# Patient Record
Sex: MALE | Race: WHITE | URBAN - METROPOLITAN AREA
[De-identification: names, ages, dates, MRNs, and addresses within clinical notes are randomized per-mention and may not be internally consistent; named-entity substitution may affect disease eponyms.]

---

## 2020-02-17 LAB — CREATININE, EXTERNAL: 1.1

## 2020-07-09 VITALS
WEIGHT: 315 LBS | BODY MASS INDEX: 44.1 KG/M2 | SYSTOLIC BLOOD PRESSURE: 131 MMHG | HEART RATE: 79 BPM | DIASTOLIC BLOOD PRESSURE: 90 MMHG | TEMPERATURE: 98 F | OXYGEN SATURATION: 95 % | HEIGHT: 71 IN

## 2020-07-09 PROBLEM — J01.90 ACUTE SINUSITIS: Status: ACTIVE | Noted: 2020-07-09

## 2020-07-09 PROBLEM — M25.50 MULTIPLE JOINT PAIN: Status: ACTIVE | Noted: 2020-07-09

## 2020-07-09 PROBLEM — I10 ESSENTIAL HYPERTENSION: Status: ACTIVE | Noted: 2020-07-09

## 2020-07-09 PROBLEM — J30.9 ALLERGIC RHINITIS: Status: ACTIVE | Noted: 2020-07-09

## 2020-07-09 PROBLEM — N41.9 PROSTATITIS: Status: ACTIVE | Noted: 2020-07-09

## 2020-07-09 RX ORDER — LISINOPRIL 10 MG/1
TABLET ORAL DAILY
COMMUNITY
End: 2021-12-30

## 2020-07-09 RX ORDER — AMOXICILLIN AND CLAVULANATE POTASSIUM 200; 28.5 MG/5ML; MG/5ML
POWDER, FOR SUSPENSION ORAL 2 TIMES DAILY
COMMUNITY
End: 2022-02-16 | Stop reason: ALTCHOICE

## 2020-07-09 RX ORDER — HYDROMORPHONE HYDROCHLORIDE 2 MG/1
TABLET ORAL
COMMUNITY
End: 2022-02-16 | Stop reason: ALTCHOICE

## 2020-07-09 RX ORDER — MELOXICAM 15 MG/1
15 TABLET ORAL DAILY
COMMUNITY
End: 2022-02-16 | Stop reason: ALTCHOICE

## 2020-07-09 RX ORDER — FEXOFENADINE HYDROCHLORIDE AND PSEUDOEPHEDRINE HYDROCHLORIDE 180; 240 MG/1; MG/1
1 TABLET, FILM COATED, EXTENDED RELEASE ORAL DAILY
COMMUNITY
End: 2022-02-16 | Stop reason: ALTCHOICE

## 2020-07-10 VITALS
SYSTOLIC BLOOD PRESSURE: 131 MMHG | BODY MASS INDEX: 44.1 KG/M2 | WEIGHT: 315 LBS | HEIGHT: 71 IN | DIASTOLIC BLOOD PRESSURE: 90 MMHG

## 2020-07-23 ENCOUNTER — OFFICE VISIT (OUTPATIENT)
Dept: ORTHOPEDIC SURGERY | Age: 45
End: 2020-07-23

## 2020-07-23 VITALS — BODY MASS INDEX: 42 KG/M2 | WEIGHT: 300 LBS | HEIGHT: 71 IN

## 2020-07-23 DIAGNOSIS — S83.8X1D ACUTE MEDIAL MENISCAL INJURY OF KNEE, RIGHT, SUBSEQUENT ENCOUNTER: Primary | ICD-10-CM

## 2020-07-23 RX ORDER — CEPHALEXIN 500 MG/1
500 CAPSULE ORAL 4 TIMES DAILY
Qty: 28 CAP | Refills: 0 | Status: SHIPPED | OUTPATIENT
Start: 2020-07-23 | End: 2020-07-28 | Stop reason: SDUPTHER

## 2020-07-23 NOTE — PROGRESS NOTES
Subjective: Curtis Donahue is a 50 y.o. male who presents today for preop visit. Past Medical History:   Diagnosis Date    Hypertension        Past Surgical History:   Procedure Laterality Date    HX CHOLECYSTECTOMY           Current Outpatient Medications   Medication Sig Dispense Refill    cephALEXin (KEFLEX) 500 mg capsule Take 1 Cap by mouth four (4) times daily. 28 Cap 0    fexofenadine-pseudoephedrine (Allegra-D 24 Hour) 180-240 mg per tablet Take 1 Tab by mouth daily.  amoxicillin-clavulanate (AUGMENTIN) 200-28.5 mg/5 mL suspension Take  by mouth two (2) times a day.  HYDROmorphone (DILAUDID) 2 mg tablet Take  by mouth every four (4) hours as needed for Pain.  lisinopriL (PRINIVIL, ZESTRIL) 10 mg tablet Take  by mouth daily.  meloxicam (MOBIC) 15 mg tablet Take 15 mg by mouth daily. Allergies   Allergen Reactions    Codeine Nausea and Vomiting       ROS:  Patient is a pleasant appearing individual, appropriately dressed, well hydrated, well nourished, who is alert, appropriately oriented for age, and in no acute distress with a normal gait and normal affect who does not appear to be in any significant pain. The patient was counseled about the risks of isabel Covid-19 during their perioperative period and any recovery window from their procedure. The patient was made aware that isabel Covid-19 may worsen their prognosis for recovering from their procedure and lend to a higher morbidity and/or mortality risk. All material risks, benefits, and reasonable alternatives including postponing the procedure were discussed. The patient DOES wish to proceed with their procedure at this time. Please note that this patient will be scheduled for surgery and that Dr. Mile Traore is requesting a pre-op medical clearance for GENERAL Anesthesia. Our office will schedule the appt for the medical clearance with the medical provider.  After evaluating the patient, please fax all labs, EKGs, diagnostic studies, and a risk assessment / clearance note (that is legible or typed ) indicating if the patient is medically cleared to Dr. Duane Dennison (Attn: Surgery Scheduling) ASAP to [phone]. If the patient is not or will not be medically cleared prior to the surgery date please notify Dr. Jackson Peñaloza office. Thank you for assisting me in this patient's care. Physical Exam:  Right Knee - Slight decrease range of motion, Grossly neurovascularly intact, Good cap refill, No skin lesions, Mild swelling, No gross instability, Some Quadriceps weakness, Positive medial joint line tenderness, Positive Peng's exam    Left Knee - Normal range of motion, Grossly neurovascularly intact, Good cap refill, No skin lesions, No swelling, No gross instability, No weakness, No medial joint line tenderness, Negative Peng's exam    ICD-10-CM ICD-9-CM    1. Acute medial meniscal injury of knee, right, subsequent encounter  S83.8X1D V58.89      959.7        HPI:  The patient is here status post right medial meniscus tear here for right knee arthroscopy meniscectomy synovectomy preop visit. The patient has had continued difficulty, has failed conservative treatment. He does say he got an abrasion from a family members dog approximately 5 days ago on the lower leg, otherwise no new complaints. Physical Exam:  Physical examination shows an approximately 2 ½ cm abrasion on the lower distal tibia of the right lower extremity, well-healing, no signs of drainage. It is crusted over, otherwise no other abrasions or lesions noted. Assessment/Plan:  1. Right knee medial meniscus tear. Plan for patient, right knee arthroscopy meniscectomy synovectomy. The patient has been medically cleared for surgery, agrees to [employer] and consent and risk of exposure. If he gets any new abrasion, he is going to call the office as needed as soon as possible. We will send in an antibiotic. He will take that until gone. If he gets any new abrasion, he will call us. I did warn him if it gets worse, the surgery may get canceled. Keep it covered with a medicated band-aid in the meantime and go from there. If anything changes or gets worse in the meantime, patient can call the office as needed and well go from there. Stop all blood thinners 7-10 days prior to surgery. KORTNEY Roberts      Follow-up and Dispositions    · Return for ALREADY SCHEDULED.

## 2020-07-28 ENCOUNTER — DOCUMENTATION ONLY (OUTPATIENT)
Dept: ORTHOPEDIC SURGERY | Age: 45
End: 2020-07-28

## 2020-07-28 DIAGNOSIS — M25.50 MULTIPLE JOINT PAIN: Primary | ICD-10-CM

## 2020-07-28 DIAGNOSIS — G89.18 POSTOPERATIVE PAIN: ICD-10-CM

## 2020-07-28 RX ORDER — OXYCODONE AND ACETAMINOPHEN 5; 325 MG/1; MG/1
1 TABLET ORAL
Qty: 30 TAB | Refills: 0 | Status: SHIPPED | OUTPATIENT
Start: 2020-07-28 | End: 2020-08-04

## 2020-07-28 RX ORDER — CEPHALEXIN 500 MG/1
500 CAPSULE ORAL 4 TIMES DAILY
Qty: 4 CAP | Refills: 0 | Status: SHIPPED | OUTPATIENT
Start: 2020-07-28 | End: 2020-07-29

## 2020-08-18 ENCOUNTER — VIRTUAL VISIT (OUTPATIENT)
Dept: ORTHOPEDIC SURGERY | Age: 45
End: 2020-08-18
Payer: COMMERCIAL

## 2020-08-18 DIAGNOSIS — S83.8X1D ACUTE MEDIAL MENISCAL INJURY OF KNEE, RIGHT, SUBSEQUENT ENCOUNTER: Primary | ICD-10-CM

## 2020-08-18 DIAGNOSIS — G89.29 CHRONIC PAIN OF RIGHT KNEE: ICD-10-CM

## 2020-08-18 DIAGNOSIS — M25.561 CHRONIC PAIN OF RIGHT KNEE: ICD-10-CM

## 2020-08-18 PROCEDURE — 99024 POSTOP FOLLOW-UP VISIT: CPT | Performed by: PHYSICIAN ASSISTANT

## 2020-08-18 NOTE — PROGRESS NOTES
[facility] and [facility] Medicine  General Follow up    Subjective: Douglas Thorpe is a 50 y.o. male presents for follow up after right Knee medial meniscus tear 3 weeks ago. Pain is generally well controlled. Patient is generally without complaint. He does admit to some significant bruising and has not yet started his aspirin. He states that he was having so much swelling and discomfort that he could not start therapy until several days ago. He feels as though he is now progressing. This encounter took place virtually. The patient [place]. We spent about 10 minutes discussing his issues during the visit. ROS  Patient is a pleasant appearing individual, appropriately dressed, well hydrated, well nourished, who is alert, appropriately oriented for age, and in no acute distress with a normal gait and normal affect who does not appear to be in any significant pain. Objective:     VSS, AFEB      Physical Exam  Right Knee - Slight decrease range of motion, Grossly neurovascularly intact, Good cap refill, No skin lesions, Mild swelling, No gross instability, Some Quadriceps weakness, Positive medial joint line tenderness    Left Knee - Normal range of motion, Grossly neurovascularly intact, Good cap refill, No skin lesions, No swelling, No gross instability, No weakness, No medial joint line tenderness, Negative Peng's exam    Assessment:     The primary encounter diagnosis was Acute medial meniscal injury of knee, right, subsequent encounter. A diagnosis of Chronic pain of right knee was also pertinent to this visit. Doing well overall. Mr. MACK [facility]AGASTUME [facility] has a reminder for a \"due or due soon\" health maintenance. I have asked that he contact his primary care provider for follow-up on this health maintenance. Plan:     --Patient is now \"ready\" to try to progress in physical therapy. I encouraged him to proceed as discussed.   Given his slow progression we will touch base virtually in 2 weeks, should he stop progressing or regress he will not hesitate to contact us and/or let us know. Questions were solicited and answered to the patient's satisfaction and the patient will follow-up as discussed.       Signed By: Edith Angeles PA-C     August 18, 2020

## 2020-09-03 ENCOUNTER — OFFICE VISIT (OUTPATIENT)
Dept: ORTHOPEDIC SURGERY | Age: 45
End: 2020-09-03
Payer: COMMERCIAL

## 2020-09-03 DIAGNOSIS — G89.29 CHRONIC PAIN OF RIGHT KNEE: ICD-10-CM

## 2020-09-03 DIAGNOSIS — M25.561 CHRONIC PAIN OF RIGHT KNEE: ICD-10-CM

## 2020-09-03 DIAGNOSIS — S83.8X1D ACUTE MEDIAL MENISCAL INJURY OF KNEE, RIGHT, SUBSEQUENT ENCOUNTER: Primary | ICD-10-CM

## 2020-09-03 DIAGNOSIS — E66.01 OBESITY, MORBID (HCC): ICD-10-CM

## 2020-09-03 PROCEDURE — 99024 POSTOP FOLLOW-UP VISIT: CPT | Performed by: PHYSICIAN ASSISTANT

## 2020-09-03 NOTE — PROGRESS NOTES
[facility] and [facility]  General Follow up    Subjective: Floresita Barker is a 50 y.o. male presents for follow up after right Knee medial meniscus tear 4 weeks ago. Pain is generally well controlled. Patient is generally without complaint. He does admit to some significant bruising and has not yet started his aspirin. He states that he was having so much swelling and discomfort that he could not start therapy until several days ago. He feels as though he is now progressing. He has started PT and is doing well. ROS  Patient is a pleasant appearing individual, appropriately dressed, well hydrated, well nourished, who is alert, appropriately oriented for age, and in no acute distress with a normal gait and normal affect who does not appear to be in any significant pain. Objective:     VSS, AFEB      Physical Exam  Right Knee - Slight decrease range of motion, Grossly neurovascularly intact, Good cap refill, No skin lesions, Mild swelling, No gross instability, Some Quadriceps weakness, Positive medial joint line tenderness     Left Knee - Normal range of motion, Grossly neurovascularly intact, Good cap refill, No skin lesions, No swelling, No gross instability, No weakness, No medial joint line tenderness, Negative Peng's exam       Assessment:     Acute medial meniscal injury of knee, right, subsequent encounter [S83. 8X1D]   Problem List Items Addressed This Visit     None      Visit Diagnoses     Acute medial meniscal injury of knee, right, subsequent encounter    -  Primary    Chronic pain of right knee             Doing well overall. Mr. Marei Dominguez has a reminder for a \"due or due soon\" health maintenance. I have asked that he contact his primary care provider for follow-up on this health maintenance. Plan:     --Continue post op care plan, PT and ADL's. F/U Dr. Grace Jimenez as needed, sooner should need be.  Pt may return to work without restrictions starting Tues 8 Sept 2020 due to the upcoming holiday. Questions were solicited and answered to the patient's satisfaction and the patient will follow-up as discussed.       Signed By: Meche Baez PA-C     September 3, 2020

## 2020-09-04 ENCOUNTER — OFFICE VISIT (OUTPATIENT)
Dept: FAMILY MEDICINE CLINIC | Age: 45
End: 2020-09-04
Payer: COMMERCIAL

## 2020-09-04 DIAGNOSIS — S83.206D TEAR OF RIGHT MENISCUS AS CURRENT INJURY, SUBSEQUENT ENCOUNTER: Primary | ICD-10-CM

## 2020-09-04 PROCEDURE — 99213 OFFICE O/P EST LOW 20 MIN: CPT | Performed by: FAMILY MEDICINE

## 2020-09-04 NOTE — PROGRESS NOTES
Odilia Prasad presents today for   Chief Complaint   Patient presents with    Letter for School/Work         Depression Screening:  3 most recent PHQ Screens 9/4/2020   Little interest or pleasure in doing things Several days   Feeling down, depressed, irritable, or hopeless Several days   Total Score PHQ 2 2       Learning Assessment:  Learning Assessment 7/23/2020   PRIMARY LEARNER Patient   HIGHEST LEVEL OF EDUCATION - PRIMARY LEARNER  2 YEARS OF COLLEGE   BARRIERS PRIMARY LEARNER NONE   PRIMARY LANGUAGE ENGLISH   LEARNER PREFERENCE PRIMARY LISTENING   LEARNING SPECIAL TOPICS NO   ANSWERED BY PATIENT   RELATIONSHIP SELF       Health Maintenance reviewed and discussed and ordered per Provider. Health Maintenance Due   Topic Date Due    DTaP/Tdap/Td series (1 - Tdap) 05/13/1994    Lipid Screen  05/13/2013    Flu Vaccine (1) 09/01/2020   . Coordination of Care:  1. Have you been to the ER, urgent care clinic since your last visit? Hospitalized since your last visit? no    2. Have you seen or consulted any other health care providers outside of the [address] [place] [address] since your last visit? Include any pap smears or colon screening.  no      Last UDS Checked no  Last Pain contract signed: no

## 2020-09-04 NOTE — PROGRESS NOTES
Subjective: Kya Colvin is a 50 y.o. male who was seen for Letter for School/Work    HPI patient is a 24-year-old male who has a history of surgery a month ago on the right knee. He had meniscus surgery and osteoarthritis cleaned out he is feeling much better. He does a lot of walking at work he works as a  he has had no falls or injuries no rash no syncope no loss of consciousness. He is going back to work on 9/8/2020 his bowel movements have been appropriate he has had no falls or injuries no rash no syncope no loss of consciousness. No chest pain or shortness of breath. Nobody at home is been sick his blood pressures have been excellent his wife checks them. He does not be significant alcohol or tobacco.  He has had no recent falls he sleeps well bowel movements have been appropriate he is even played golf recently and that was quite successful. Denies any anxiety or depression he will be going back to work as a  but he thinks he can do all the things that he is being asked to do    Home Medications    Medication Sig Start Date End Date Taking? Authorizing Provider   fexofenadine-pseudoephedrine (Allegra-D 24 Hour) 180-240 mg per tablet Take 1 Tab by mouth daily. Provider, Historical   amoxicillin-clavulanate (AUGMENTIN) 200-28.5 mg/5 mL suspension Take  by mouth two (2) times a day. Provider, Historical   HYDROmorphone (DILAUDID) 2 mg tablet Take  by mouth every four (4) hours as needed for Pain. Provider, Historical   lisinopriL (PRINIVIL, ZESTRIL) 10 mg tablet Take  by mouth daily. Provider, Historical   meloxicam (MOBIC) 15 mg tablet Take 15 mg by mouth daily.     Provider, Historical      Allergies   Allergen Reactions    Codeine Nausea and Vomiting     Social History     Tobacco Use    Smoking status: Never Smoker    Smokeless tobacco: Never Used   Substance Use Topics    Alcohol use: Yes     Frequency: Monthly or less    Drug use: Not on file Review of Systems   Constitutional: Negative. HENT: Negative. Eyes: Negative. Respiratory: Negative. Cardiovascular: Negative. Gastrointestinal: Negative. Genitourinary: Negative. Musculoskeletal: Positive for joint swelling. Allergic/Immunologic: Negative. Neurological: Negative. Hematological: Negative. Psychiatric/Behavioral: Negative. Physical Exam   Objective: There were no vitals taken for this visit. General: alert, cooperative, no distress   Mental  status: normal mood, behavior, speech, dress, motor activity, and thought processes, able to follow commands   HENT: NCAT   Neck: no visualized mass   Resp: no respiratory distress   Neuro: no gross deficits   Skin: no discoloration or lesions of concern on visible areas   Psychiatric: normal affect, consistent with stated mood, no evidence of hallucinations   Actually think he has very good range of motion of the knee I do not appreciate any swelling or redness his pulses appropriate no edema all vital signs are appropriate his gait seems good. He is in no significant distress at all. He is certainly not clinically anxious or depressed. Assessment & Plan:     Viscus injury status post surgery, hypertension: I am very pleased with the recovery in his knee. It looks quite excellent I do suspect he will have a knee replacement probably in the next 4 to 5 years but is certainly is acceptable for going back to work I have given him a note to let him go back to work on 9/8/2020 and will follow-up if any other issues arise. He does not need anti-inflammatory medicine at this time. He will call me if any other issues arise. 712    Additional exam findings: We discussed the expected course, resolution and complications of the diagnosis(es) in detail. Medication risks, benefits, costs, interactions, and alternatives were discussed as indicated.   I advised him to contact the office if his condition worsens, changes or fails to improve as anticipated. He expressed understanding with the diagnosis(es) and plan.

## 2021-03-16 ENCOUNTER — VIRTUAL VISIT (OUTPATIENT)
Dept: FAMILY MEDICINE CLINIC | Age: 46
End: 2021-03-16
Payer: COMMERCIAL

## 2021-03-16 DIAGNOSIS — I10 ESSENTIAL HYPERTENSION: Primary | ICD-10-CM

## 2021-03-16 DIAGNOSIS — L65.9 ALOPECIA: ICD-10-CM

## 2021-03-16 DIAGNOSIS — M25.50 MULTIPLE JOINT PAIN: ICD-10-CM

## 2021-03-16 DIAGNOSIS — E66.01 OBESITY, MORBID (HCC): ICD-10-CM

## 2021-03-16 DIAGNOSIS — I83.93 VARICOSE VEINS OF BOTH LOWER EXTREMITIES, UNSPECIFIED WHETHER COMPLICATED: ICD-10-CM

## 2021-03-16 DIAGNOSIS — L30.9 DERMATITIS: ICD-10-CM

## 2021-03-16 PROCEDURE — 99442 PR PHYS/QHP TELEPHONE EVALUATION 11-20 MIN: CPT | Performed by: FAMILY MEDICINE

## 2021-03-16 RX ORDER — LISINOPRIL 20 MG/1
20 TABLET ORAL DAILY
COMMUNITY
End: 2021-03-17

## 2021-03-16 NOTE — PROGRESS NOTES
Saúl Corea presents today for   Chief Complaint   Patient presents with    Rash     hair loss- 1 month       Depression Screening:  3 most recent PHQ Screens 3/16/2021   Little interest or pleasure in doing things Not at all   Feeling down, depressed, irritable, or hopeless Not at all   Total Score PHQ 2 0       Learning Assessment:  Learning Assessment 7/23/2020   PRIMARY LEARNER Patient   HIGHEST LEVEL OF EDUCATION - PRIMARY LEARNER  2 YEARS OF COLLEGE   BARRIERS PRIMARY LEARNER NONE   PRIMARY LANGUAGE ENGLISH   LEARNER PREFERENCE PRIMARY LISTENING   LEARNING SPECIAL TOPICS NO   ANSWERED BY PATIENT   RELATIONSHIP SELF       Health Maintenance reviewed and discussed and ordered per Provider. Health Maintenance Due   Topic Date Due    Hepatitis C Screening  Never done    COVID-19 Vaccine (1) Never done    DTaP/Tdap/Td series (1 - Tdap) Never done    Lipid Screen  Never done    Flu Vaccine (1) Never done   . Coordination of Care:  1. Have you been to the ER, urgent care clinic since your last visit? Hospitalized since your last visit? no    2. Have you seen or consulted any other health care providers outside of the [address] [place] [address] since your last visit? Include any pap smears or colon screening.  No    Both legs from knee to ankle

## 2021-03-16 NOTE — PROGRESS NOTES
Gilma Choe is a 50 y.o. male, evaluated via audio-only technology on 3/16/2021 for Rash (hair loss- 1 month)  . Assessment & Plan: Hypertension, joint pain, obesity, loss of hair in the extremities, chronic dermatitis, varicose veins of the lower extremities: This was a 20-minute telephone to telephone visit the patient insisted that he wanted to be seen the next day I think he needs a vascular venous evaluation of his varicose veins that could the be the reason for hair loss on his legs. He has a dermatitis that I cannot explain and were going to set up a dermatology appointment he has had joint discomfort as well and that the little concerning almost wonders about psoriasis. His blood pressures have been appropriate. He works on a regular basis. He has some obesity as well. There are multiple issues that I think need a face-to-face evaluation the patient is insistent on being seen tomorrow I was in my office the patient was at his home       12  Subjective: The patient is a 49-year-old male who is seen for evaluation he seen in a telephone evaluation today he has had no falls or injuries no rash no syncope or loss of consciousness. Bowel movements have been appropriate. Eating relatively well. Nobody at home has been sick no chest pain or shortness of breath no rash no syncope or loss of consciousness. He has some hair loss in his lower legs. No falls or injuries no rashes except new ones which are in several places on his body eating relatively well he has lower leg varicosities which are bothersome       Prior to Admission medications    Medication Sig Start Date End Date Taking? Authorizing Provider   lisinopriL (PRINIVIL, ZESTRIL) 20 mg tablet Take 20 mg by mouth daily. Yes Provider, Historical   fexofenadine-pseudoephedrine (Allegra-D 24 Hour) 180-240 mg per tablet Take 1 Tab by mouth daily.  As needed   Yes Provider, Historical   amoxicillin-clavulanate (AUGMENTIN) 200-28.5 mg/5 mL suspension Take  by mouth two (2) times a day. Provider, Historical   HYDROmorphone (DILAUDID) 2 mg tablet Take  by mouth every four (4) hours as needed for Pain. Provider, Historical   lisinopriL (PRINIVIL, ZESTRIL) 10 mg tablet Take  by mouth daily. Provider, Historical   meloxicam (MOBIC) 15 mg tablet Take 15 mg by mouth daily. Provider, Historical     Patient Active Problem List   Diagnosis Code    Acute sinusitis J01.90    Allergic rhinitis J30.9    Essential hypertension I10    Multiple joint pain M25.50    Prostatitis N41.9    Obesity, morbid ([facility] [address] E66.01    Alopecia L65.9    Dermatitis L30.9    Varicose veins of both lower extremities I83.93       Review of Systems   Constitutional: Negative. Eyes: Negative. Respiratory: Negative. Cardiovascular: Negative. Gastrointestinal: Negative. Genitourinary: Negative. Musculoskeletal: Positive for back pain. Skin: Negative. Neurological: Negative. Endo/Heme/Allergies: Negative. Psychiatric/Behavioral: Negative. No flowsheet data found. Dieudonne Reed, who was evaluated through a patient-initiated, synchronous (real-time) audio only encounter, and/or her healthcare decision maker, is aware that it is a billable service, with coverage as determined by his insurance carrier. He provided verbal consent to proceed: n/a- consent obtained within past 12 months. He has not had a related appointment within my department in the past 7 days or scheduled within the next 24 hours.       Total Time: minutes: 11-20 minutes    Beatriz Zhao MD

## 2021-03-17 ENCOUNTER — OFFICE VISIT (OUTPATIENT)
Dept: FAMILY MEDICINE CLINIC | Age: 46
End: 2021-03-17
Payer: COMMERCIAL

## 2021-03-17 VITALS
BODY MASS INDEX: 44.47 KG/M2 | HEIGHT: 70 IN | OXYGEN SATURATION: 95 % | DIASTOLIC BLOOD PRESSURE: 71 MMHG | SYSTOLIC BLOOD PRESSURE: 108 MMHG | HEART RATE: 94 BPM | TEMPERATURE: 97 F | WEIGHT: 310.6 LBS

## 2021-03-17 DIAGNOSIS — L30.9 DERMATITIS: ICD-10-CM

## 2021-03-17 DIAGNOSIS — L65.9 ALOPECIA: ICD-10-CM

## 2021-03-17 DIAGNOSIS — I10 ESSENTIAL HYPERTENSION: ICD-10-CM

## 2021-03-17 DIAGNOSIS — M25.50 MULTIPLE JOINT PAIN: ICD-10-CM

## 2021-03-17 DIAGNOSIS — I83.93 VARICOSE VEINS OF BOTH LOWER EXTREMITIES, UNSPECIFIED WHETHER COMPLICATED: ICD-10-CM

## 2021-03-17 DIAGNOSIS — L29.9 PRURITUS: ICD-10-CM

## 2021-03-17 DIAGNOSIS — L29.9 PRURITUS: Primary | ICD-10-CM

## 2021-03-17 PROCEDURE — 99213 OFFICE O/P EST LOW 20 MIN: CPT | Performed by: FAMILY MEDICINE

## 2021-03-17 RX ORDER — LISINOPRIL 20 MG/1
20 TABLET ORAL DAILY
Qty: 90 TAB | Refills: 1 | Status: SHIPPED | OUTPATIENT
Start: 2021-03-17 | End: 2021-09-24 | Stop reason: SDUPTHER

## 2021-03-17 NOTE — PROGRESS NOTES
Adolfo Navarro presents today for   Chief Complaint   Patient presents with    Rash     both legs and he is losing his hair x's a month , very itchy, he has not changed detergent or soaps       Is someone accompanying this pt? no    Is the patient using any DME equipment during [address]? no    Depression Screening:  3 most recent PHQ Screens 3/17/2021   Little interest or pleasure in doing things Not at all   Feeling down, depressed, irritable, or hopeless Not at all   Total Score PHQ 2 0       Learning Assessment:  Learning Assessment 7/23/2020   PRIMARY LEARNER Patient   HIGHEST LEVEL OF EDUCATION - PRIMARY LEARNER  2 YEARS OF COLLEGE   BARRIERS PRIMARY LEARNER NONE   PRIMARY LANGUAGE ENGLISH   LEARNER PREFERENCE PRIMARY LISTENING   LEARNING SPECIAL TOPICS NO   ANSWERED BY PATIENT   RELATIONSHIP SELF       Fall Risk  Fall Risk Assessment, last 12 mths 3/17/2021   Able to walk? Yes   Fall in past 12 months? 0   Do you feel unsteady? 0   Are you worried about falling 0       ADL  ADL Assessment 3/17/2021   Feeding yourself No Help Needed   Getting from bed to chair No Help Needed   Getting dressed No Help Needed   Bathing or showering No Help Needed   Walk across the room (includes cane/walker) No Help Needed   Using the telphone No Help Needed   Taking your medications No Help Needed   Preparing meals No Help Needed   Managing money (expenses/bills) No Help Needed   Moderately strenuous housework (laundry) No Help Needed   Shopping for personal items (toiletries/medicines) No Help Needed   Shopping for groceries No Help Needed   Driving No Help Needed   Climbing a flight of stairs No Help Needed   Getting to places beyond walking distances No Help Needed       Travel Screening:    Travel Screening     Question   Response    In the last month, have you been in contact with someone who was confirmed or suspected to have Coronavirus / COVID-19? No / Unsure    Have you had a COVID-19 viral test in the last 14 days?   No Do you have any of the following new or worsening symptoms? Have you traveled internationally or domestically in the last month? No      Travel History   Travel since 02/17/21     No documented travel since 02/17/21          Health Maintenance reviewed and discussed and ordered per Provider. Health Maintenance Due   Topic Date Due    Hepatitis C Screening  Never done    COVID-19 Vaccine (1) Never done    DTaP/Tdap/Td series (1 - Tdap) Never done    Lipid Screen  Never done    Flu Vaccine (1) Never done   . Coordination of Care:  1. Have you been to the ER, urgent care clinic since your last visit? Hospitalized since your last visit? no    2. Have you seen or consulted any other health care providers outside of the [address] [place] [address] since your last visit? Include any pap smears or colon screening.  no

## 2021-03-17 NOTE — PROGRESS NOTES
Subjective: Rony Combs is a 50 y.o. male who was seen for Rash (both legs and he is losing his hair x's a month , very itchy, he has not changed detergent or soaps)    HPI is seen today. He was having some itching in his skin I am not sure why he was doing that I brought him in the office for evaluation. He is also complaining of varicose veins in his legs and a rash. He is losing the hair on his legs. He has had no falls or injuries. He has had no rashes otherwise. He works in a [employer]. He is grossly overweight. He has had no falls or injuries. He is eating relatively well. But up an appointment for dermatology and also for a venous doctor to evaluate the veins. Home Medications    Medication Sig Start Date End Date Taking? Authorizing Provider   lisinopriL (PRINIVIL, ZESTRIL) 20 mg tablet Take 20 mg by mouth daily. Provider, Historical   fexofenadine-pseudoephedrine (Allegra-D 24 Hour) 180-240 mg per tablet Take 1 Tab by mouth daily. As needed    Provider, Historical   amoxicillin-clavulanate (AUGMENTIN) 200-28.5 mg/5 mL suspension Take  by mouth two (2) times a day. Provider, Historical   HYDROmorphone (DILAUDID) 2 mg tablet Take  by mouth every four (4) hours as needed for Pain. Provider, Historical   lisinopriL (PRINIVIL, ZESTRIL) 10 mg tablet Take  by mouth daily. Provider, Historical   meloxicam (MOBIC) 15 mg tablet Take 15 mg by mouth daily.     Provider, Historical      Allergies   Allergen Reactions    Codeine Nausea and Vomiting     Social History     Tobacco Use    Smoking status: Never Smoker    Smokeless tobacco: Never Used   Substance Use Topics    Alcohol use: Yes     Frequency: Monthly or less    Drug use: Never        Patient Active Problem List   Diagnosis Code    Acute sinusitis J01.90    Allergic rhinitis J30.9    Essential hypertension I10    Multiple joint pain M25.50    Prostatitis N41.9    Obesity, morbid ([address] E66.01    Alopecia L65.9  Dermatitis L30.9    Varicose veins of both lower extremities I83.93       Review of Systems   Constitutional: Negative. HENT: Negative. Eyes: Negative. Respiratory: Negative. Endocrine: Negative. Genitourinary: Negative. Musculoskeletal: Negative. Allergic/Immunologic: Negative. Neurological: Negative. Hematological: Negative. Psychiatric/Behavioral: Negative. Physical Exam   Objective:     Visit Vitals  /71   Pulse 94   Temp 97 °F (36.1 °C)   Ht 5' 10\" (1.778 m)   Wt 310 lb 9.6 oz (140.9 kg)   SpO2 95%   BMI 44.57 kg/m²      General: alert, cooperative, no distress   Mental  status: normal mood, behavior, speech, dress, motor activity, and thought processes, able to follow commands   HENT: NCAT   Neck: no visualized mass   Resp: no respiratory distress   Neuro: no gross deficits   Skin: no discoloration or lesions of concern on visible areas   Psychiatric: normal affect, consistent with stated mood, no evidence of hallucinations   Strongly impressed with his rash but he is. He has some small varicosities but they are in several areas on his legs he seems to be overly distraught about them as well. The pruritus is very confusing I am going to get blood work. I am going to send him to dermatology and also send him to the venous study doctor and follow-up from there    Assessment & Plan:             712    Additional exam findings: We discussed the expected course, resolution and complications of the diagnosis(es) in detail. Medication risks, benefits, costs, interactions, and alternatives were discussed as indicated. I advised him to contact the office if his condition worsens, changes or fails to improve as anticipated. He expressed understanding with the diagnosis(es) and plan.

## 2021-03-18 LAB — CREATININE, EXTERNAL: 0.9

## 2021-03-20 ENCOUNTER — TELEPHONE (OUTPATIENT)
Dept: FAMILY MEDICINE CLINIC | Age: 46
End: 2021-03-20

## 2021-09-24 DIAGNOSIS — I10 ESSENTIAL HYPERTENSION: ICD-10-CM

## 2021-09-24 RX ORDER — LISINOPRIL 20 MG/1
20 TABLET ORAL DAILY
Qty: 90 TABLET | Refills: 0 | Status: SHIPPED | OUTPATIENT
Start: 2021-09-24 | End: 2021-12-30

## 2021-10-05 ENCOUNTER — OFFICE VISIT (OUTPATIENT)
Dept: ORTHOPEDIC SURGERY | Age: 46
End: 2021-10-05
Payer: COMMERCIAL

## 2021-10-05 VITALS — BODY MASS INDEX: 43.67 KG/M2 | HEIGHT: 70 IN | WEIGHT: 305 LBS

## 2021-10-05 DIAGNOSIS — M25.562 LEFT KNEE PAIN, UNSPECIFIED CHRONICITY: Primary | ICD-10-CM

## 2021-10-05 DIAGNOSIS — M17.12 OSTEOARTHRITIS OF LEFT KNEE, UNSPECIFIED OSTEOARTHRITIS TYPE: ICD-10-CM

## 2021-10-05 PROCEDURE — 99214 OFFICE O/P EST MOD 30 MIN: CPT | Performed by: ORTHOPAEDIC SURGERY

## 2021-10-05 NOTE — PROGRESS NOTES
Name: Rolf Joshua    : 1975     Service Dept: [facility]    Patient's Pharmacies:    [address], [address] [id number] [facility] [address]  Phone: [phone] Fax: [phone]       Chief Complaint   Patient presents with    Leg Pain     Left        Visit Vitals  Ht 5' 10\" (1.778 m)   Wt 305 lb (138.3 kg)   BMI 43.76 kg/m²      Allergies   Allergen Reactions    Codeine Nausea and Vomiting      Current Outpatient Medications   Medication Sig Dispense Refill    lisinopriL (PRINIVIL, ZESTRIL) 20 mg tablet Take 1 Tablet by mouth daily. Patient needs appointment 90 Tablet 0    fexofenadine-pseudoephedrine (Allegra-D 24 Hour) 180-240 mg per tablet Take 1 Tab by mouth daily. As needed      amoxicillin-clavulanate (AUGMENTIN) 200-28.5 mg/5 mL suspension Take  by mouth two (2) times a day.  HYDROmorphone (DILAUDID) 2 mg tablet Take  by mouth every four (4) hours as needed for Pain.  lisinopriL (PRINIVIL, ZESTRIL) 10 mg tablet Take  by mouth daily.  meloxicam (MOBIC) 15 mg tablet Take 15 mg by mouth daily.         Patient Active Problem List   Diagnosis Code    Acute sinusitis J01.90    Allergic rhinitis J30.9    Essential hypertension I10    Multiple joint pain M25.50    Prostatitis N41.9    Obesity, morbid ([address] E66.01    Alopecia L65.9    Dermatitis L30.9    Varicose veins of both lower extremities I83.93      Family History   Problem Relation Age of Onset    Cancer Mother     Lung Disease Father       Social History     Socioeconomic History    Marital status:      Spouse name: Not on file    Number of children: Not on file    Years of education: Not on file    Highest education level: Not on file   Tobacco Use    Smoking status: Never Smoker    Smokeless tobacco: Never Used   Vaping Use    Vaping Use: Never used   Substance and Sexual Activity  Alcohol use: Yes    Drug use: Never     Social Determinants of Health     Financial Resource Strain:     Difficulty of Paying Living Expenses:    Food Insecurity:     Worried About Running Out of Food in the Last Year:     [address] in the Last Year:    Transportation Needs:     Lack of Transportation (Medical):  Lack of Transportation (Non-Medical):    Physical Activity:     Days of Exercise per Week:     Minutes of Exercise per Session:    Stress:     Feeling of Stress :    Social Connections:     Frequency of Communication with Friends and Family:     Frequency of Social Gatherings with Friends and Family:     Attends Hinduism Services:     Active Member of Clubs or Organizations:     Attends Club or Organization Meetings:     Marital Status:       Past Surgical History:   Procedure Laterality Date    HX CHOLECYSTECTOMY        Past Medical History:   Diagnosis Date    Hypertension         I have reviewed and agree with [address] [place] [address] Nw and ROS and intake form in chart and the record furthermore I have reviewed prior medical record(s) regarding this patients care during this appointment. Review of Systems:   Patient is a pleasant appearing individual, appropriately dressed, well hydrated, well nourished, who is alert, appropriately oriented for age, and in no acute distress with a normal gait and normal affect who does not appear to be in any significant pain. Physical Exam:  Left Knee - Slight decrease range of motion, Grossly neurovascularly intact, Good cap refill, No skin lesions, Mild swelling, No gross instability, Some Quadriceps weakness, Positive medial joint line tenderness, Positive Peng's exam    Right Knee - Normal range of motion, Grossly neurovascularly intact, Good cap refill, No skin lesions, No swelling, No gross instability, No weakness, No medial joint line tenderness, Negative Peng's exam   Encounter Diagnoses     ICD-10-CM ICD-9-CM   1.  Left knee pain, unspecified chronicity  M25.562 719.46   2. Osteoarthritis of left knee, unspecified osteoarthritis type  M17.12 715.96       HPI:  The patient is here with a chief complaint of left knee pain, twisting and injury with it where he felt a pop and having pain in the back of the knee and on the medial side. Pain is significant. Pain is 6/10. Ice and ibuprofen have helped. Pressure makes it worse. X-rays again AP, lateral, and oblique done in our office are unremarkable. Assessment/Plan:  1. Left knee possible meniscal pathology. We will keep him out of work until we get the post-MRI results and also I want to go ahead and put him in for an MRI of the left knee. I will see him back post-MRI and go from there. As part of continued conservative pain management options the patient was advised to utilize Tylenol or OTC NSAIDS as long as it is not medically contraindicated. Return to Office: Follow-up and Dispositions    · Return for POST MRI. Scribed by Shannon Olea LPN as dictated by [facility] [place] AMAURI Castillo MD.  Documentation True and Accepted [facility] AMAURI Castillo MD

## 2021-10-05 NOTE — PATIENT INSTRUCTIONS
Knee Pain or Injury: Care Instructions  Your Care Instructions     Injuries are a common cause of knee problems. Sudden (acute) injuries may be caused by a direct blow to the knee. They can also be caused by abnormal twisting, bending, or falling on the knee. Pain, bruising, or swelling may be severe, and may start within minutes of the injury. Overuse is another cause of knee pain. Other causes are climbing stairs, kneeling, and other activities that use the knee. Everyday wear and tear, especially as you get older, also can cause knee pain. Rest, along with home treatment, often relieves pain and allows your knee to heal. If you have a serious knee injury, you may need tests and treatment. Follow-up care is a key part of your treatment and safety. Be sure to make and go to all appointments, and call your doctor if you are having problems. It's also a good idea to know your test results and keep a list of the medicines you take. How can you care for yourself at home? · Be safe with medicines. Read and follow all instructions on the label. ? If the doctor gave you a prescription medicine for pain, take it as prescribed. ? If you are not taking a prescription pain medicine, ask your doctor if you can take an over-the-counter medicine. · Rest and protect your knee. Take a break from any activity that may cause pain. · Put ice or a cold pack on your knee for 10 to 20 minutes at a time. Put a thin cloth between the ice and your skin. · Prop up a sore knee on a pillow when you ice it or anytime you sit or lie down for the next 3 days. Try to keep it above the level of your heart. This will help reduce swelling. · If your knee is not swollen, you can put moist heat, a heating pad, or a warm cloth on your knee. · If your doctor recommends an elastic bandage, sleeve, or other type of support for your knee, wear it as directed.   · Follow your doctor's instructions about how much weight you can put on your leg. Use a cane, crutches, or a walker as instructed. · Follow your doctor's instructions about activity during your healing process. If you can do mild exercise, slowly increase your activity. · Reach and stay at a healthy weight. Extra weight can strain the joints, especially the knees and hips, and make the pain worse. Losing even a few pounds may help. When should you call for help? Call 911 anytime you think you may need emergency care. For example, call if:    · You have symptoms of a blood clot in your lung (called a pulmonary embolism). These may include:  ? Sudden chest pain. ? Trouble breathing. ? Coughing up blood. Call your doctor now or seek immediate medical care if:    · You have severe or increasing pain.     · Your leg or foot turns cold or changes color.     · You cannot stand or put weight on your knee.     · Your knee looks twisted or bent out of shape.     · You cannot move your knee.     · You have signs of infection, such as:  ? Increased pain, swelling, warmth, or redness. ? Red streaks leading from the knee. ? Pus draining from a place on your knee. ? A fever.     · You have signs of a blood clot in your leg (called a deep vein thrombosis), such as:  ? Pain in your calf, back of the knee, thigh, or groin. ? Redness and swelling in your leg or groin. Watch closely for changes in your health, and be sure to contact your doctor if:    · You have tingling, weakness, or numbness in your knee.     · You have any new symptoms, such as swelling.     · You have bruises from a knee injury that last longer than 2 weeks.     · You do not get better as expected. Where can you learn more? Go to http://www.gray.com/  Enter K195 in the search box to learn more about \"Knee Pain or Injury: Care Instructions. \"  Current as of: July 1, 2021               Content Version: 13.0  © 0813-5953 [employer]wise, Inc[employer].    Care instructions adapted under license by [employer] (which disclaims liability or warranty for this information). If you have questions about a medical condition or this instruction, always ask your healthcare professional. [address] any warranty or liability for your use of this information.

## 2021-10-05 NOTE — LETTER
NOTIFICATION RETURN TO WORK / SCHOOL    10/5/2021 8:31 AM    Mr. Ngoc Willoughby  [address] [facility] [address]      To Whom It May Concern:    Ngoc Willoughby is currently under the care of [address] [place] [address]. He will return to work once he is reevaluated in our office post MRI. If there are questions or concerns please have the patient contact our office.         Sincerely,      Radha Mckenna MD

## 2021-10-07 ENCOUNTER — OFFICE VISIT (OUTPATIENT)
Dept: ORTHOPEDIC SURGERY | Age: 46
End: 2021-10-07
Payer: COMMERCIAL

## 2021-10-07 DIAGNOSIS — M17.12 OSTEOARTHRITIS OF LEFT KNEE, UNSPECIFIED OSTEOARTHRITIS TYPE: ICD-10-CM

## 2021-10-07 DIAGNOSIS — M25.562 LEFT KNEE PAIN, UNSPECIFIED CHRONICITY: ICD-10-CM

## 2021-10-07 DIAGNOSIS — M25.562 LEFT KNEE PAIN, UNSPECIFIED CHRONICITY: Primary | ICD-10-CM

## 2021-10-07 PROCEDURE — 99214 OFFICE O/P EST MOD 30 MIN: CPT | Performed by: ORTHOPAEDIC SURGERY

## 2021-10-07 PROCEDURE — 20611 DRAIN/INJ JOINT/BURSA W/US: CPT | Performed by: ORTHOPAEDIC SURGERY

## 2021-10-07 RX ORDER — LIDOCAINE HYDROCHLORIDE 10 MG/ML
9 INJECTION INFILTRATION; PERINEURAL ONCE
Status: COMPLETED | OUTPATIENT
Start: 2021-10-07 | End: 2021-10-07

## 2021-10-07 RX ORDER — TRIAMCINOLONE ACETONIDE 40 MG/ML
40 INJECTION, SUSPENSION INTRA-ARTICULAR; INTRAMUSCULAR ONCE
Status: COMPLETED | OUTPATIENT
Start: 2021-10-07 | End: 2021-10-07

## 2021-10-07 RX ADMIN — TRIAMCINOLONE ACETONIDE 40 MG: 40 INJECTION, SUSPENSION INTRA-ARTICULAR; INTRAMUSCULAR at 16:00

## 2021-10-07 RX ADMIN — LIDOCAINE HYDROCHLORIDE 9 ML: 10 INJECTION INFILTRATION; PERINEURAL at 16:00

## 2021-10-07 NOTE — LETTER
NOTIFICATION RETURN TO WORK / SCHOOL    10/7/2021 3:31 PM    Mr. Kirsty Neri  [address] [facility] [address]      To Whom It May Concern:    Kirsty Neri is currently under the care of [address] [place] [address]. He will return to work 11/1/2021 with no restrictions. If there are questions or concerns please have the patient contact our office.         Sincerely,      Den Milton MD

## 2021-10-07 NOTE — PROGRESS NOTES
Name: Aimee Sagastume    : 1975     Service Dept: [address] [facility]    Patient's Pharmacies:    [facility] [address] [id number] [facility] [address]  Phone: [phone] Fax: [phone]       Chief Complaint   Patient presents with    Knee Pain        There were no vitals taken for this visit. Allergies   Allergen Reactions    Codeine Nausea and Vomiting      Current Outpatient Medications   Medication Sig Dispense Refill    lisinopriL (PRINIVIL, ZESTRIL) 20 mg tablet Take 1 Tablet by mouth daily. Patient needs appointment 90 Tablet 0    fexofenadine-pseudoephedrine (Allegra-D 24 Hour) 180-240 mg per tablet Take 1 Tab by mouth daily. As needed      amoxicillin-clavulanate (AUGMENTIN) 200-28.5 mg/5 mL suspension Take  by mouth two (2) times a day.  HYDROmorphone (DILAUDID) 2 mg tablet Take  by mouth every four (4) hours as needed for Pain.  lisinopriL (PRINIVIL, ZESTRIL) 10 mg tablet Take  by mouth daily.  meloxicam (MOBIC) 15 mg tablet Take 15 mg by mouth daily.        Current Facility-Administered Medications   Medication Dose Route Frequency Provider Last Rate Last Admin    [COMPLETED] lidocaine (XYLOCAINE) 10 mg/mL (1 %) injection 9 mL  9 mL Other ONCE Jensen Wright MD   9 mL at 10/07/21 1600    [COMPLETED] triamcinolone acetonide (KENALOG-40) 40 mg/mL injection 40 mg  40 mg Intra artICUlar ONCE Jensen Wright MD   40 mg at 10/07/21 1600      Patient Active Problem List   Diagnosis Code    Acute sinusitis J01.90    Allergic rhinitis J30.9    Essential hypertension I10    Multiple joint pain M25.50    Prostatitis N41.9    Obesity, morbid (HCC) E66.01    Alopecia L65.9    Dermatitis L30.9    Varicose veins of both lower extremities I83.93      Family History   Problem Relation Age of Onset    Cancer Mother     Lung Disease Father       Social History Socioeconomic History    Marital status:      Spouse name: Not on file    Number of children: Not on file    Years of education: Not on file    Highest education level: Not on file   Tobacco Use    Smoking status: Never Smoker    Smokeless tobacco: Never Used   Vaping Use    Vaping Use: Never used   Substance and Sexual Activity    Alcohol use: Yes    Drug use: Never     Social Determinants of Health     Financial Resource Strain:     Difficulty of Paying Living Expenses:    Food Insecurity:     Worried About Running Out of Food in the Last Year:     [address] in the Last Year:    Transportation Needs:     Lack of Transportation (Medical):  Lack of Transportation (Non-Medical):    Physical Activity:     Days of Exercise per Week:     Minutes of Exercise per Session:    Stress:     Feeling of Stress :    Social Connections:     Frequency of Communication with Friends and Family:     Frequency of Social Gatherings with Friends and Family:     Attends Mu-ism Services:     Active Member of Clubs or Organizations:     Attends Club or Organization Meetings:     Marital Status:       Past Surgical History:   Procedure Laterality Date    HX CHOLECYSTECTOMY        Past Medical History:   Diagnosis Date    Hypertension         I have reviewed and agree with [address] [facility] [address] and ROS and intake form in chart and the record furthermore I have reviewed prior medical record(s) regarding this patients care during this appointment. Review of Systems:   Patient is a pleasant appearing individual, appropriately dressed, well hydrated, well nourished, who is alert, appropriately oriented for age, and in no acute distress with a normal gait and normal affect who does not appear to be in any significant pain.    Physical Exam:  Left Knee - Slight decrease range of motion, Grossly neurovascularly intact, Good cap refill, No skin lesions, Mild swelling, No gross instability, Some Quadriceps weakness, Positive medial joint line tenderness, Positive Peng's exam    Right Knee - Normal range of motion, Grossly neurovascularly intact, Good cap refill, No skin lesions, No swelling, No gross instability, No weakness, No medial joint line tenderness, Negative Peng's exam   Encounter Diagnoses     ICD-10-CM ICD-9-CM   1. Left knee pain, unspecified chronicity  M25.562 719.46   2. Osteoarthritis of left knee, unspecified osteoarthritis type  M17.12 715.96       HPI:  The patient is here with a chief complaint of left knee pain, dull throbbing pain, progressively getting worse. Pain is 6/10. X-rays of the left knee are unremarkable. MRI is positive for meniscal root tear and some swelling. Assessment/Plan:  Plan at this point, we will try a cortisone injection in left knee. If it helps, it is all we need to do. We will also put the patient for quad PT and go from there. If he is not better in 3 weeks, we will talk to him about arthroscopic surgery. We will also continue to work on weight loss program.      As part of continued conservative pain management options the patient was advised to utilize Tylenol or OTC NSAIDS as long as it is not medically contraindicated. Return to Office: Follow-up and Dispositions    · Return in about 3 weeks (around 10/28/2021). Administrations This Visit     lidocaine (XYLOCAINE) 10 mg/mL (1 %) injection 9 mL     Admin Date  10/07/2021 Action  Given Dose  9 mL Route  Other Administered By  Thao Barker LPN          triamcinolone acetonide (KENALOG-40) 40 mg/mL injection 40 mg     Admin Date  10/07/2021 Action  Given Dose  40 mg Route  Intra artICUlar Administered By  Thao Barker LPN               Scribed by Madeline Booth LPN as dictated by [facility] AMAURI Ledezma MD.  Documentation True and Accepted Jensen Ledezma MD

## 2021-10-07 NOTE — LETTER
Teddy Caprices   1975   [id number]       10/7/2021       I hereby authorize and direct Jensen Cadet MD, Saúl Esteban, and whomever he may designate as his associate to perform upon myself the following procedure:    Injection of: Kenalog, Supartz, Euflexxa, Orthovisc in the Right/Left ____________________. If any unforeseen condition arises in the course of the procedure, I further authorize him and his associated and/or assistant(s) to do whatever he/she deems advisable. The nature, purpose, benefits, risks, side effects, likelihood of achieving goals, and potential problems that might occur during recuperation, risks for not receiving the proposed care, treatment and services and alternatives of the procedure have been fully explained to me by my physician including, but not limited to:    Swelling, joint pain, skin pigment changes, worsening of condition, and failure to improve. I acknowledge that no guarantee or assurance has been made to me as to the results that may be obtained or the likelihood of success.                 _______________________________________     Signature of patient or authorized representative                [employer] [facility] fax: [phone]

## 2021-10-07 NOTE — PATIENT INSTRUCTIONS
Meniscus Tear: Care Instructions  Overview     The meniscus is rubbery tissue in the knee that acts as a shock absorber between the upper and lower leg bones. The meniscus also keeps your knee stable by spreading weight across it. Each knee has two menisci (plural of meniscus). You can tear a meniscus if you plant your foot and twist, or pivot. The meniscus also can wear down as you age, and it can tear from squatting or kneeling. Small tears may heal on their own with rest and some physical therapy. But a more serious tear may need surgery to repair it or to remove part of the meniscus. Your doctor may want you to see a doctor who specializes in bones and sports injuries. Follow-up care is a key part of your treatment and safety. Be sure to make and go to all appointments, and call your doctor if you are having problems. It's also a good idea to know your test results and keep a list of the medicines you take. How can you care for yourself at home? · Rest your knee when possible. · Do not squat or kneel. · Take pain medicines exactly as directed. ? If the doctor gave you a prescription medicine for pain, take it as prescribed. ? If you are not taking a prescription pain medicine, ask your doctor if you can take an over-the-counter medicine. · Put ice or a cold pack on your knee for 10 to 20 minutes at a time. Try to do this every 1 to 2 hours for the next 3 days (when you are awake) or until the swelling goes down. Put a thin cloth between the ice and your skin. · Prop up the sore leg on a pillow when you ice your knee or any time you sit or lie down during the next 3 days. Try to keep your leg above the level of your heart. This will help reduce swelling. · Follow your doctor's directions for using crutches or a knee brace, if suggested. · Follow your doctor's directions for exercises to keep your knee mobile and your leg muscles strong.  Here are a few exercises you can try if your doctor says it is okay. ? Quad sets: Lie down on the floor or the bed with your injured leg straight. Fully extend your legthere should be no or little bend in your knee. Tighten the thigh (quadriceps) of your injured leg for 6 seconds. Do not lift your heel up. Relax your quadriceps for 10 seconds. Repeat this exercise 8 to 12 times several times during the day. ? Straight-leg raises: Lie down on the floor or the bed with your injured leg flat and your uninjured leg bent so that the bottom of your foot is on the floor or bed. Tighten the quadriceps of your injured leg. Keeping your knee as straight as possible, lift your injured leg off the bed until it is about 18 inches above the bed or floor. Lower your leg back down and relax for 5 seconds. Do 3 sets of 20 repetitions, or if you tire quickly, 3 sets of 8 to 12 repetitions. ? Heel raises: Stand with your feet a few inches apart. Rest your hands lightly on a counter or chair in front of you. Slowly raise your heels off the floor while keeping your knees straight. Hold for 3 seconds, then slowly lower your heels to the floor. Do 3 sets of 8 to 12 repetitions. ? Heel slides: Lie down on the floor or the bed with your leg flat. Slowly begin to slide your heel toward your rear end (buttocks), keeping your heel on the floor. Your knee will begin to bend. Slide your heel and bend your knee until it becomes a little sore and you can feel a small amount of pressure inside your knee. Hold this position for 10 seconds. Slide your heel back down until your leg is straight on the floor. Relax for 10 seconds. Repeat this exercise 20 times. When should you call for help? Watch closely for changes in your health, and be sure to contact your doctor if:    · You have increasing knee pain or swelling or both.     · Your knee is so sore or stiff that you cannot walk on it.     · You do not get better as expected. Where can you learn more?   Go to http://www.gray.com/  Enter [id number] in the search box to learn more about \"Meniscus Tear: Care Instructions. \"  Current as of: July 1, 2021               Content Version: 13.0  © 3560-0547 [employer], [employer]. Care instructions adapted under license by [employer] (which disclaims liability or warranty for this information). If you have questions about a medical condition or this instruction, always ask your healthcare professional. [place] [address] any warranty or liability for your use of this information.

## 2021-10-18 ENCOUNTER — APPOINTMENT (OUTPATIENT)
Dept: PHYSICAL THERAPY | Age: 46
End: 2021-10-18

## 2021-10-26 ENCOUNTER — OFFICE VISIT (OUTPATIENT)
Dept: ORTHOPEDIC SURGERY | Age: 46
End: 2021-10-26
Payer: COMMERCIAL

## 2021-10-26 DIAGNOSIS — M25.562 LEFT KNEE PAIN, UNSPECIFIED CHRONICITY: Primary | ICD-10-CM

## 2021-10-26 PROCEDURE — 99213 OFFICE O/P EST LOW 20 MIN: CPT | Performed by: ORTHOPAEDIC SURGERY

## 2021-10-26 NOTE — LETTER
10/27/2021    Patient: Emil Menchaca   YOB: 1975   Date of Visit: 10/26/2021     Jacques Salazar NP  [facility] [address] [id number]  [address] [facility] [address]    Dear Jacques Salazar NP,      Thank you for referring Mr. Steve Jeter to [address] [place] [address] for evaluation. My notes for this consultation are attached. If you have questions, please do not hesitate to call me. I look forward to following your patient along with you.       Sincerely,    Iliana Polanco MD

## 2021-10-26 NOTE — PROGRESS NOTES
Name: Gilma Choe    : 1975     Service Dept: [facility]    Patient's Pharmacies:    [facility] [address] [address]  Phone: [phone] Fax: [phone]       Chief Complaint   Patient presents with    Knee Pain        There were no vitals taken for this visit. Allergies   Allergen Reactions    Codeine Nausea and Vomiting      Current Outpatient Medications   Medication Sig Dispense Refill    lisinopriL (PRINIVIL, ZESTRIL) 20 mg tablet Take 1 Tablet by mouth daily. Patient needs appointment 90 Tablet 0    fexofenadine-pseudoephedrine (Allegra-D 24 Hour) 180-240 mg per tablet Take 1 Tab by mouth daily. As needed      amoxicillin-clavulanate (AUGMENTIN) 200-28.5 mg/5 mL suspension Take  by mouth two (2) times a day.  HYDROmorphone (DILAUDID) 2 mg tablet Take  by mouth every four (4) hours as needed for Pain.  lisinopriL (PRINIVIL, ZESTRIL) 10 mg tablet Take  by mouth daily.  meloxicam (MOBIC) 15 mg tablet Take 15 mg by mouth daily. Patient Active Problem List   Diagnosis Code    Acute sinusitis J01.90    Allergic rhinitis J30.9    Essential hypertension I10    Multiple joint pain M25.50    Prostatitis N41.9    Obesity, morbid ([address] E66.01    Alopecia L65.9    Dermatitis L30.9    Varicose veins of both lower extremities I83.93      Family History   Problem Relation Age of Onset    Cancer Mother     Lung Disease Father       Social History     Socioeconomic History    Marital status:      Spouse name: Not on file    Number of children: Not on file    Years of education: Not on file    Highest education level: Not on file   Tobacco Use    Smoking status: Never Smoker    Smokeless tobacco: Never Used   Vaping Use    Vaping Use: Never used   Substance and Sexual Activity    Alcohol use:  Yes    Drug use: Never Social Determinants of Health     Financial Resource Strain:     Difficulty of Paying Living Expenses:    Food Insecurity:     Worried About Running Out of Food in the Last Year:     [address] in the Last Year:    Transportation Needs:     Lack of Transportation (Medical):  Lack of Transportation (Non-Medical):    Physical Activity:     Days of Exercise per Week:     Minutes of Exercise per Session:    Stress:     Feeling of Stress :    Social Connections:     Frequency of Communication with Friends and Family:     Frequency of Social Gatherings with Friends and Family:     Attends [employer] Services:     Active Member of Clubs or Organizations:     Attends Club or Organization Meetings:     Marital Status:       Past Surgical History:   Procedure Laterality Date    HX CHOLECYSTECTOMY        Past Medical History:   Diagnosis Date    Hypertension         I have reviewed and agree with [address] [place] [address] Nw and ROS and intake form in chart and the record furthermore I have reviewed prior medical record(s) regarding this patients care during this appointment. Review of Systems:   Patient is a pleasant appearing individual, appropriately dressed, well hydrated, well nourished, who is alert, appropriately oriented for age, and in no acute distress with a normal gait and normal affect who does not appear to be in any significant pain. Physical Exam:  Left Knee - Slight decrease range of motion, Grossly neurovascularly intact, Good cap refill, No skin lesions, Mild swelling, No gross instability, Some Quadriceps weakness, Positive medial joint line tenderness, Positive Peng's exam    Right Knee - Normal range of motion, Grossly neurovascularly intact, Good cap refill, No skin lesions, No swelling, No gross instability, No weakness, No medial joint line tenderness, Negative Peng's exam   Encounter Diagnoses     ICD-10-CM ICD-9-CM   1.  Left knee pain, unspecified chronicity  M25.562 719.46 HPI:  The patient is here with a chief complaint of left knee pain, dull, throbbing pain, diagnosed with meniscus tear. Pain is 4/10. He had a cortisone injection and therapy with some relief. Assessment/Plan:  Plan at this point, activities as tolerated, weightbearing started. Continue exercise and weight loss program.  If he decides, plan will be for left knee arthroscopy and meniscectomy. He is going to think about it and let us know. We will let him go back to work with no restrictions. As part of continued conservative pain management options the patient was advised to utilize Tylenol or OTC NSAIDS as long as it is not medically contraindicated. Return to Office: Follow-up and Dispositions    · Return if symptoms worsen or fail to improve. Scribed by Bard Wong LPN as dictated by [facility] AMAURI Mccann MD.  Documentation True and Accepted Jensen Mccann MD

## 2021-10-26 NOTE — LETTER
NOTIFICATION RETURN TO WORK / SCHOOL    10/26/2021 9:45 AM    Mr. Teddy Wilson  [address] [facility] [address]      To Whom It May Concern:    Teddy Wilson is currently under the care of [address] [place] [address] [place]. He will return to work 12/27/21 with no restrictions. If there are questions or concerns please have the patient contact our office.         Sincerely,      Yue Langley MD

## 2021-12-27 DIAGNOSIS — I10 ESSENTIAL HYPERTENSION: ICD-10-CM

## 2021-12-27 NOTE — TELEPHONE ENCOUNTER
----- Message from Emily Garcia sent at 12/27/2021 12:10 PM EST -----  Subject: Refill Request    QUESTIONS  Name of Medication? lisinopriL (PRINIVIL, ZESTRIL) 20 mg tablet  Patient-reported dosage and instructions? once a day   How many days do you have left? 16  Preferred Pharmacy? [address]  Pharmacy phone number (if available)? [phone]  ---------------------------------------------------------------------------  --------------  CALL BACK INFO  What is the best way for the office to contact you? OK to leave message on   voicemail  Preferred Call Back Phone Number?  [phone]

## 2021-12-30 DIAGNOSIS — I10 ESSENTIAL HYPERTENSION: ICD-10-CM

## 2021-12-30 RX ORDER — LISINOPRIL 20 MG/1
20 TABLET ORAL DAILY
Qty: 90 TABLET | Refills: 0 | Status: CANCELLED | OUTPATIENT
Start: 2021-12-30

## 2021-12-30 RX ORDER — LISINOPRIL 20 MG/1
20 TABLET ORAL DAILY
Qty: 90 TABLET | Refills: 0 | Status: SHIPPED | OUTPATIENT
Start: 2021-12-30 | End: 2022-02-25 | Stop reason: SDUPTHER

## 2022-02-16 ENCOUNTER — OFFICE VISIT (OUTPATIENT)
Dept: FAMILY MEDICINE CLINIC | Age: 47
End: 2022-02-16
Payer: COMMERCIAL

## 2022-02-16 VITALS
DIASTOLIC BLOOD PRESSURE: 78 MMHG | HEIGHT: 71 IN | OXYGEN SATURATION: 95 % | HEART RATE: 86 BPM | SYSTOLIC BLOOD PRESSURE: 119 MMHG | TEMPERATURE: 98.4 F | BODY MASS INDEX: 44.1 KG/M2 | WEIGHT: 315 LBS

## 2022-02-16 DIAGNOSIS — Z13.220 SCREENING CHOLESTEROL LEVEL: ICD-10-CM

## 2022-02-16 DIAGNOSIS — N52.8 OTHER MALE ERECTILE DYSFUNCTION: ICD-10-CM

## 2022-02-16 DIAGNOSIS — E55.9 VITAMIN D DEFICIENCY: ICD-10-CM

## 2022-02-16 DIAGNOSIS — R68.89 OTHER GENERAL SYMPTOMS AND SIGNS: ICD-10-CM

## 2022-02-16 DIAGNOSIS — I10 ESSENTIAL HYPERTENSION: Primary | ICD-10-CM

## 2022-02-16 DIAGNOSIS — Z87.438 HISTORY OF PROSTATITIS: ICD-10-CM

## 2022-02-16 PROCEDURE — 99214 OFFICE O/P EST MOD 30 MIN: CPT | Performed by: NURSE PRACTITIONER

## 2022-02-16 RX ORDER — LISINOPRIL 20 MG/1
20 TABLET ORAL DAILY
Qty: 90 TABLET | Refills: 2 | Status: CANCELLED | OUTPATIENT
Start: 2022-02-16

## 2022-02-16 RX ORDER — SILDENAFIL 25 MG/1
25 TABLET, FILM COATED ORAL
Qty: 30 TABLET | Refills: 0 | Status: SHIPPED | OUTPATIENT
Start: 2022-02-16 | End: 2022-03-18 | Stop reason: SDUPTHER

## 2022-02-16 NOTE — PROGRESS NOTES
History of Present Illness  Star Floyd is a 48 y.o. male who presents today for:    Chief Complaint   Patient presents with    [facility]    Medication Refill       Past Medical History  Past Medical History:   Diagnosis Date    Hypertension         Surgical History  Past Surgical History:   Procedure Laterality Date    HX CHOLECYSTECTOMY          Current Medications  Current Outpatient Medications   Medication Sig    sildenafil citrate (VIAGRA) 25 mg tablet Take 1 Tablet by mouth daily as needed for Erectile Dysfunction.  lisinopriL (PRINIVIL, ZESTRIL) 20 mg tablet Take 1 Tablet by mouth daily. Patient needs appointment     No current facility-administered medications for this visit. Allergies/Drug Reactions  Allergies   Allergen Reactions    Codeine Nausea and Vomiting        Family History  Family History   Problem Relation Age of Onset    Cancer Mother     Lung Disease Father         Social History  Social History     Tobacco Use    Smoking status: Never Smoker    Smokeless tobacco: Never Used   Vaping Use    Vaping Use: Never used   Substance Use Topics    Alcohol use:  Yes    Drug use: Never        Health Maintenance   Topic Date Due    COVID-19 Vaccine (1) Never done    Lipid Screen  Never done    Flu Vaccine (1) 06/30/2022 (Originally 9/1/2021)    DTaP/Tdap/Td series (1 - Tdap) 02/16/2023 (Originally 5/13/1994)    Depression Screen  02/16/2023    Colorectal Cancer Screening Combo  05/13/2024    Pneumococcal 0-64 years  Aged Out    Hepatitis C Screening  Discontinued     Physical Exam  Vital signs:   Vitals:    02/16/22 1651   BP: 119/78   Pulse: 86   Temp: 98.4 °F (36.9 °C)   SpO2: 95%   Weight: 316 lb 12.8 oz (143.7 kg)   Height: 5' 11\" (1.803 m)     General: alert, oriented, not in distress  Head: scalp normal, atraumatic  Eyes: pupils are equal and reactive, full and intact EOM's  Ears: patent ear canal, intact tympanic membrane  Nose: normal turbinates, no congestion or discharge  Lips/Mouth: moist lips and buccal mucosa, non-enlarged tonsils, pink throat  Neck: supple, no JVD, no lymphadenopathy, non-palpable thyroid  Chest/Lungs: clear breath sounds, no wheezing or crackles  Heart: normal rate, regular rhythm, no murmur  Abdomen: soft, non-distended, non-tender, normal bowel sounds, no organomegaly, no masses  Extremities: no focal deformities, no edema  Skin: no active skin lesions    Laboratory/Tests:  No visits with results within 3 Month(s) from this visit. Latest known visit with results is:   Abstract on 03/29/2021   Component Date Value Ref Range Status    Creatinine, External 03/18/2021 0.90   Final     Patient reports left knee meniscal tear and he is followed by orthopedist, Dr. Gio Garcia, and he was given option of surgery, but he reports Dr. Gio Garcia informed patient it would not provide much improvement/relief. Patient reports history of right knee meniscal tear repair surgery. Patient reports history of numerous skin tags. Patient reports 4 to 5 month history of skin tag near left groin. Patient reports history of prostatitis X 2. Patient reports symptom onset of decreased urine stream.      Patient reports history of ED. He reports his is able to have erection, but he may experience difficulty with maintaining erection. Patient reports his erection difficulty is not constant. Physical examination performed:  Bilateral ear tympanic membrane visualized on otoscopic examination revealed no abnormalities. Cardiac auscultation revealed no arrhythmias or murmurs. Bilateral lung sounds clear to auscultation in all fields. Abdomen soft and nontender, bowel sounds normoactive in all 4 quadrants. There is no observed bilateral lower extremity edema. Assessment/Plan:    1. Essential hypertension. Continue Lisinopril 20 mg tablet daily for management of essential hypertension. 2.  Erectile dysfunction.   Prescribed Sildenafil 25 mg tablet daily as needed for management of erectile dysfunction. I have discussed the diagnosis with the patient and the intended plan as seen in the above orders. The patient has received an after-visit summary and questions were answered concerning future plans. I have discussed medication side effects and warnings with the patient as well. I have reviewed the plan of care with the patient, accepted their input and they are in agreement with the treatment goals.        Ricky Pichardo NP  February 17, 2022

## 2022-02-16 NOTE — PROGRESS NOTES
Rc Donohue presents today for   Chief Complaint   Patient presents with   [place] Establish Care    Medication Refill       Is someone accompanying this pt? no    Is the patient using any DME equipment during OV? no    Depression Screening:  3 most recent PHQ Screens 2/16/2022   Little interest or pleasure in doing things Not at all   Feeling down, depressed, irritable, or hopeless Not at all   Total Score PHQ 2 0       Learning Assessment:  Learning Assessment 7/23/2020   PRIMARY LEARNER Patient   HIGHEST LEVEL OF EDUCATION - PRIMARY LEARNER  2 YEARS OF COLLEGE   BARRIERS PRIMARY LEARNER NONE   PRIMARY LANGUAGE ENGLISH   LEARNER PREFERENCE PRIMARY LISTENING   LEARNING SPECIAL TOPICS NO   ANSWERED BY PATIENT   RELATIONSHIP SELF       Fall Risk  Fall Risk Assessment, last 12 mths 3/17/2021   Able to walk? Yes   Fall in past 12 months? 0   Do you feel unsteady? 0   Are you worried about falling 0       ADL  ADL Assessment 2/16/2022   Feeding yourself No Help Needed   Getting from bed to chair No Help Needed   Getting dressed No Help Needed   Bathing or showering No Help Needed   Walk across the room (includes cane/walker) No Help Needed   Using the telphone No Help Needed   Taking your medications No Help Needed   Preparing meals No Help Needed   Managing money (expenses/bills) No Help Needed   Moderately strenuous housework (laundry) No Help Needed   Shopping for personal items (toiletries/medicines) No Help Needed   Shopping for groceries No Help Needed   Driving No Help Needed   Climbing a flight of stairs No Help Needed   Getting to places beyond walking distances No Help Needed       Travel Screening:    Travel Screening     Question   Response    In the last month, have you been in contact with someone who was confirmed or suspected to have Coronavirus / COVID-19? No / Unsure    Have you had a COVID-19 viral test in the last 14 days? No    Do you have any of the following new or worsening symptoms?   None of these    Have you traveled internationally or domestically in the last month? No      Travel History   Travel since 01/16/22    No documented travel since 01/16/22         Health Maintenance reviewed and discussed and ordered per Provider. Health Maintenance Due   Topic Date Due    COVID-19 Vaccine (1) Never done    Lipid Screen  Never done   . Coordination of Care:  1. Have you been to the ER, urgent care clinic since your last visit? Hospitalized since your last visit? no    2. Have you seen or consulted any other health care providers outside of the [address] [place] [address] since your last visit? Include any pap smears or colon screening.  no

## 2022-02-17 DIAGNOSIS — Z87.438 HISTORY OF PROSTATITIS: ICD-10-CM

## 2022-02-17 DIAGNOSIS — E55.9 VITAMIN D DEFICIENCY: ICD-10-CM

## 2022-02-17 DIAGNOSIS — I10 ESSENTIAL HYPERTENSION: ICD-10-CM

## 2022-02-17 DIAGNOSIS — R68.89 OTHER GENERAL SYMPTOMS AND SIGNS: ICD-10-CM

## 2022-02-17 DIAGNOSIS — Z13.220 SCREENING CHOLESTEROL LEVEL: ICD-10-CM

## 2022-02-25 DIAGNOSIS — I10 ESSENTIAL HYPERTENSION: ICD-10-CM

## 2022-02-28 RX ORDER — LISINOPRIL 20 MG/1
20 TABLET ORAL DAILY
Qty: 90 TABLET | Refills: 3 | Status: SHIPPED | OUTPATIENT
Start: 2022-02-28 | End: 2022-03-18 | Stop reason: SDUPTHER

## 2022-03-07 ENCOUNTER — HOSPITAL ENCOUNTER (OUTPATIENT)
Dept: LAB | Age: 47
Discharge: HOME | End: 2022-03-07
Payer: COMMERCIAL

## 2022-03-07 LAB
25(OH)D3 SERPL-MCNC: 10.5 NG/ML (ref 30–100)
ALBUMIN SERPL-MCNC: 3.8 G/DL (ref 3.5–4.7)
ALBUMIN/GLOB SERPL: 1.2 {RATIO}
ALP SERPL-CCNC: 72 U/L (ref 38–126)
ALT SERPL-CCNC: 28 U/L (ref 3–72)
ANION GAP SERPL CALC-SCNC: 10 MMOL/L
AST SERPL W P-5'-P-CCNC: 20 U/L (ref 17–74)
BASOPHILS # BLD: 0.1 K/UL (ref 0–0.1)
BASOPHILS NFR BLD: 1 % (ref 0–2)
BILIRUB SERPL-MCNC: 1.1 MG/DL (ref 0.2–1)
BUN SERPL-MCNC: 18 MG/DL (ref 9–21)
BUN/CREAT SERPL: 23
CA-I BLD-MCNC: 9.2 MG/DL (ref 8.5–10.5)
CHLORIDE SERPL-SCNC: 105 MMOL/L (ref 94–111)
CHOLEST SERPL-MCNC: 145 MG/DL
CO2 SERPL-SCNC: 28 MMOL/L (ref 21–33)
CREAT SERPL-MCNC: 0.8 MG/DL (ref 0.8–1.5)
DIFFERENTIAL METHOD BLD: ABNORMAL
EOSINOPHIL # BLD: 0.2 K/UL (ref 0–0.4)
EOSINOPHIL NFR BLD: 2 % (ref 0–5)
ERYTHROCYTE [DISTWIDTH] IN BLOOD BY AUTOMATED COUNT: 12.3 % (ref 11.6–14.5)
EST. AVERAGE GLUCOSE BLD GHB EST-MCNC: 120 MG/DL
FOLATE SERPL-MCNC: 10.2 NG/ML (ref 3.1–17.5)
GLOBULIN SER CALC-MCNC: 3.2 G/DL
GLUCOSE SERPL-MCNC: 86 MG/DL (ref 70–110)
HBA1C MFR BLD: 5.8 % (ref 4.2–5.6)
HCT VFR BLD AUTO: 50.1 % (ref 36–48)
HDLC SERPL-MCNC: 43 MG/DL (ref 40–60)
HDLC SERPL: 3.4 {RATIO} (ref 0–5)
HGB BLD-MCNC: 16.5 G/DL (ref 13–16)
IMM GRANULOCYTES # BLD AUTO: 0 K/UL (ref 0–0.04)
IMM GRANULOCYTES NFR BLD AUTO: 0 % (ref 0–0.5)
LDLC SERPL CALC-MCNC: 81.6 MG/DL (ref 0–100)
LIPID PROFILE,FLP: NORMAL
LYMPHOCYTES # BLD: 1.8 K/UL (ref 0.9–3.6)
LYMPHOCYTES NFR BLD: 23 % (ref 21–52)
MCH RBC QN AUTO: 30.6 PG (ref 24–34)
MCHC RBC AUTO-ENTMCNC: 32.9 G/DL (ref 31–37)
MCV RBC AUTO: 92.9 FL (ref 78–100)
MONOCYTES # BLD: 0.5 K/UL (ref 0.05–1.2)
MONOCYTES NFR BLD: 6 % (ref 3–10)
NEUTS SEG # BLD: 5.3 K/UL (ref 1.8–8)
NEUTS SEG NFR BLD: 68 % (ref 40–73)
NRBC # BLD: 0 K/UL (ref 0–0.01)
NRBC BLD-RTO: 0 PER 100 WBC
PLATELET # BLD AUTO: 342 K/UL (ref 135–420)
PMV BLD AUTO: 9.9 FL (ref 9.2–11.8)
POTASSIUM SERPL-SCNC: 4 MMOL/L (ref 3.2–5.1)
PROT SERPL-MCNC: 7 G/DL (ref 6.1–8.4)
PSA SERPL-MCNC: 1.3 NG/ML (ref 0–4)
RBC # BLD AUTO: 5.39 M/UL (ref 4.35–5.65)
SODIUM SERPL-SCNC: 143 MMOL/L (ref 135–145)
TRIGL SERPL-MCNC: 102 MG/DL (ref ?–150)
TSH SERPL DL<=0.05 MIU/L-ACNC: 2.21 UIU/ML (ref 0.35–6.2)
VIT B12 SERPL-MCNC: 325 PG/ML (ref 211–911)
VLDLC SERPL CALC-MCNC: 20.4 MG/DL
WBC # BLD AUTO: 7.8 K/UL (ref 4.6–13.2)

## 2022-03-07 PROCEDURE — 82607 VITAMIN B-12: CPT

## 2022-03-07 PROCEDURE — 84443 ASSAY THYROID STIM HORMONE: CPT

## 2022-03-07 PROCEDURE — 85025 COMPLETE CBC W/AUTO DIFF WBC: CPT

## 2022-03-07 PROCEDURE — 80053 COMPREHEN METABOLIC PANEL: CPT

## 2022-03-07 PROCEDURE — 82306 VITAMIN D 25 HYDROXY: CPT

## 2022-03-07 PROCEDURE — 84153 ASSAY OF PSA TOTAL: CPT

## 2022-03-07 PROCEDURE — 80061 LIPID PANEL: CPT

## 2022-03-07 PROCEDURE — 36415 COLL VENOUS BLD VENIPUNCTURE: CPT

## 2022-03-07 PROCEDURE — 83036 HEMOGLOBIN GLYCOSYLATED A1C: CPT

## 2022-03-18 ENCOUNTER — OFFICE VISIT (OUTPATIENT)
Dept: FAMILY MEDICINE CLINIC | Age: 47
End: 2022-03-18
Payer: COMMERCIAL

## 2022-03-18 VITALS
HEIGHT: 70 IN | HEART RATE: 84 BPM | DIASTOLIC BLOOD PRESSURE: 75 MMHG | WEIGHT: 315 LBS | OXYGEN SATURATION: 94 % | SYSTOLIC BLOOD PRESSURE: 104 MMHG | BODY MASS INDEX: 45.1 KG/M2 | RESPIRATION RATE: 18 BRPM

## 2022-03-18 DIAGNOSIS — E66.01 MORBID OBESITY WITH BMI OF 45.0-49.9, ADULT (HCC): ICD-10-CM

## 2022-03-18 DIAGNOSIS — N52.8 OTHER MALE ERECTILE DYSFUNCTION: ICD-10-CM

## 2022-03-18 DIAGNOSIS — I10 ESSENTIAL HYPERTENSION: ICD-10-CM

## 2022-03-18 DIAGNOSIS — E55.9 VITAMIN D DEFICIENCY: Primary | ICD-10-CM

## 2022-03-18 PROBLEM — N41.9 PROSTATITIS: Status: ACTIVE | Noted: 2020-07-09

## 2022-03-18 PROBLEM — L65.9 ALOPECIA: Status: ACTIVE | Noted: 2021-03-16

## 2022-03-18 PROCEDURE — 99213 OFFICE O/P EST LOW 20 MIN: CPT | Performed by: NURSE PRACTITIONER

## 2022-03-18 RX ORDER — ERGOCALCIFEROL 1.25 MG/1
50000 CAPSULE ORAL
Qty: 26 CAPSULE | Refills: 1 | Status: SHIPPED | OUTPATIENT
Start: 2022-03-18

## 2022-03-18 RX ORDER — SILDENAFIL 25 MG/1
25 TABLET, FILM COATED ORAL
Qty: 30 TABLET | Refills: 0 | Status: SHIPPED | OUTPATIENT
Start: 2022-03-18

## 2022-03-18 RX ORDER — LISINOPRIL 20 MG/1
20 TABLET ORAL DAILY
Qty: 90 TABLET | Refills: 3 | Status: SHIPPED | OUTPATIENT
Start: 2022-03-18 | End: 2022-04-01

## 2022-03-18 NOTE — PROGRESS NOTES
Esteban De La Paz presents today for   Chief Complaint   Patient presents with    Follow-up     1 month follow up        Is someone accompanying this pt? No    Is the patient using any DME equipment during OV? No    Depression Screening:  3 most recent PHQ Screens 3/18/2022   Little interest or pleasure in doing things Not at all   Feeling down, depressed, irritable, or hopeless Not at all   Total Score PHQ 2 0       Learning Assessment:  Learning Assessment 7/23/2020   PRIMARY LEARNER Patient   HIGHEST LEVEL OF EDUCATION - PRIMARY LEARNER  2 YEARS OF COLLEGE   BARRIERS PRIMARY LEARNER NONE   PRIMARY LANGUAGE ENGLISH   LEARNER PREFERENCE PRIMARY LISTENING   LEARNING SPECIAL TOPICS NO   ANSWERED BY PATIENT   RELATIONSHIP SELF       Fall Risk  Fall Risk Assessment, last 12 mths 3/17/2021   Able to walk? Yes   Fall in past 12 months? 0   Do you feel unsteady? 0   Are you worried about falling 0       ADL  ADL Assessment 2/16/2022   Feeding yourself No Help Needed   Getting from bed to chair No Help Needed   Getting dressed No Help Needed   Bathing or showering No Help Needed   Walk across the room (includes cane/walker) No Help Needed   Using the telphone No Help Needed   Taking your medications No Help Needed   Preparing meals No Help Needed   Managing money (expenses/bills) No Help Needed   Moderately strenuous housework (laundry) No Help Needed   Shopping for personal items (toiletries/medicines) No Help Needed   Shopping for groceries No Help Needed   Driving No Help Needed   Climbing a flight of stairs No Help Needed   Getting to places beyond walking distances No Help Needed       Travel Screening:    Travel Screening     Question   Response    In the last 10 days, have you been in contact with someone who was confirmed or suspected to have Coronavirus/COVID-19? No / Unsure    Have you had a COVID-19 viral test in the last 10 days? No    Do you have any of the following new or worsening symptoms?   None of these Have you traveled internationally or domestically in the last month? No      Travel History   Travel since 02/18/22    No documented travel since 02/18/22         Health Maintenance reviewed and discussed and ordered per Provider. Health Maintenance Due   Topic Date Due    COVID-19 Vaccine (1) Never done   . Coordination of Care:  1. \"Have you been to the ER, urgent care clinic since your last visit? Hospitalized since your last visit? \" No    2. \"Have you seen or consulted any other health care providers outside of the [address] [place] [address] since your last visit? \" No     3. For patients aged 39-70: Has the patient had a colonoscopy? Yes - no Care Gap present     If the patient is female:    4. For patients aged 41-77: Has the patient had a mammogram within the past 2 years? NA - based on age    11. For patients aged 21-65: Has the patient had a pap smear?  NA - based on age

## 2022-03-18 NOTE — PROGRESS NOTES
History of Present Illness  Katie Pichardo is a 48 y.o. male who presents today for:    Chief Complaint   Patient presents with    Follow-up     1 month follow up      Past Medical History  Past Medical History:   Diagnosis Date    Hypertension         Surgical History  Past Surgical History:   Procedure Laterality Date    HX CHOLECYSTECTOMY          Current Medications  Current Outpatient Medications   Medication Sig    sildenafil citrate (VIAGRA) 25 mg tablet Take 1 Tablet by mouth daily as needed for Erectile Dysfunction.  lisinopriL (PRINIVIL, ZESTRIL) 20 mg tablet Take 1 Tablet by mouth daily. Patient needs appointment     No current facility-administered medications for this visit. Allergies/Drug Reactions  Allergies   Allergen Reactions    Codeine Nausea and Vomiting        Family History  Family History   Problem Relation Age of Onset    Cancer Mother     Lung Disease Father         Social History  Social History     Tobacco Use    Smoking status: Never Smoker    Smokeless tobacco: Never Used   Vaping Use    Vaping Use: Never used   Substance Use Topics    Alcohol use:  Yes    Drug use: Never        Health Maintenance   Topic Date Due    COVID-19 Vaccine (1) Never done    Flu Vaccine (1) 06/30/2022 (Originally 9/1/2021)    DTaP/Tdap/Td series (1 - Tdap) 02/16/2023 (Originally 5/13/1994)    Depression Screen  02/16/2023    A1C test (Diabetic or Prediabetic)  03/07/2023    Colorectal Cancer Screening Combo  05/13/2024    Lipid Screen  03/07/2027    Pneumococcal 0-64 years  Aged Out    Hepatitis C Screening  Discontinued     Physical Exam  Vital signs:   Vitals:    03/18/22 1541   BP: 104/75   Pulse: 84   Resp: 18   SpO2: 94%   Weight: 320 lb 6.4 oz (145.3 kg)   Height: 5' 10\" (1.778 m)     General: alert, oriented, not in distress  Head: scalp normal, atraumatic  Eyes: pupils are equal and reactive, full and intact EOM's  Ears: patent ear canal, intact tympanic membrane  Nose: normal turbinates, no congestion or discharge  Lips/Mouth: moist lips and buccal mucosa, non-enlarged tonsils, pink throat  Neck: supple, no JVD, no lymphadenopathy, non-palpable thyroid  Chest/Lungs: clear breath sounds, no wheezing or crackles  Heart: normal rate, regular rhythm, no murmur  Abdomen: soft, non-distended, non-tender, normal bowel sounds, no organomegaly, no masses  Extremities: no focal deformities, no edema  Skin: no active skin lesions    Laboratory/Tests:  Hospital Outpatient Visit on 03/07/2022   Component Date Value Ref Range Status    WBC 03/07/2022 7.8  4.6 - 13.2 K/uL Final    RBC 03/07/2022 5.39  4.35 - 5.65 M/uL Final    HGB 03/07/2022 16.5* 13.0 - 16.0 g/dL Final    HCT 03/07/2022 50.1* 36.0 - 48.0 % Final    MCV 03/07/2022 92.9  78.0 - 100.0 FL Final    MCH 03/07/2022 30.6  24.0 - 34.0 PG Final    MCHC 03/07/2022 32.9  31.0 - 37.0 g/dL Final    RDW 03/07/2022 12.3  11.6 - 14.5 % Final    PLATELET 28/41/9064 914  135 - 420 K/uL Final    MPV 03/07/2022 9.9  9.2 - 11.8 FL Final    NRBC 03/07/2022 0.0  0.0  WBC Final    ABSOLUTE NRBC 03/07/2022 0.00  0.00 - 0.01 K/uL Final    NEUTROPHILS 03/07/2022 68  40 - 73 % Final    LYMPHOCYTES 03/07/2022 23  21 - 52 % Final    MONOCYTES 03/07/2022 6  3 - 10 % Final    EOSINOPHILS 03/07/2022 2  0 - 5 % Final    BASOPHILS 03/07/2022 1  0 - 2 % Final    IMMATURE GRANULOCYTES 03/07/2022 0  0 - 0.5 % Final    ABS. NEUTROPHILS 03/07/2022 5.3  1.8 - 8.0 K/UL Final    ABS. LYMPHOCYTES 03/07/2022 1.8  0.9 - 3.6 K/UL Final    ABS. MONOCYTES 03/07/2022 0.5  0.05 - 1.2 K/UL Final    ABS. EOSINOPHILS 03/07/2022 0.2  0.0 - 0.4 K/UL Final    ABS. BASOPHILS 03/07/2022 0.1  0.0 - 0.1 K/UL Final    ABS. IMM.  GRANS. 03/07/2022 0.0  0.00 - 0.04 K/UL Final    DF 03/07/2022 AUTOMATED    Final    Vitamin B12 03/07/2022 325  211 - 911 pg/mL Final    Folate 03/07/2022 10.2  3.10 - 17.50 ng/mL Final    Hemoglobin A1c 03/07/2022 5.8* 4.2 - 5.6 % Final    Comment: (NOTE)  HbA1C Interpretive Ranges  <5.7              Normal  5.7 - 6.4         Consider Prediabetes  >6.5              Consider Diabetes      Est. average glucose 03/07/2022 120  mg/dL Final    Comment: (NOTE)  The eAG should be interpreted with patient characteristics in mind   since ethnicity, interindividual differences, red cell lifespan,   variation in rates of glycation, etc. may affect the validity of the   calculation.  LIPID PROFILE 03/07/2022      Final    Cholesterol, total 03/07/2022 145  <200 mg/dL Final    Triglyceride 03/07/2022 102  <150 mg/dL Final    Comment: The drugs N-acetylcysteine (NAC) and  Metamiszole have been found to cause falsely  low results in this chemical assay. Please  be sure to submit blood samples obtained  BEFORE administration of either of these  drugs to assure correct results.  HDL Cholesterol 03/07/2022 43  40 - 60 mg/dL Final    LDL, calculated 03/07/2022 81.6  0 - 100 mg/dL Final    VLDL, calculated 03/07/2022 20.4  mg/dL Final    CHOL/HDL Ratio 03/07/2022 3.4  0 - 5.0   Final    Prostate Specific Ag 03/07/2022 1.3  0.0 - 4.0 ng/mL Final    Vitamin D 25-Hydroxy 03/07/2022 10.5* 30 - 100 ng/mL Final    Comment: (NOTE)  Deficiency               <20 ng/mL  Insufficiency          20-30 ng/mL  Sufficient             ng/mL  Possible toxicity       >100 ng/mL    The Method used is Siemens Advia Centaur currently standardized to a   Center of Disease Control and Prevention (CDC) certified reference   [facility]. Samples containing fluorescein dye can produce falsely   elevated values when tested with the ADVIA Centaur Vitamin D Assay. It is recommended that results in the toxic range, >100 ng/mL, be   retested 72 hours post fluorescein exposure.       Sodium 03/07/2022 143  135 - 145 mmol/L Final    Potassium 03/07/2022 4.0  3.2 - 5.1 mmol/L Final    Chloride 03/07/2022 105  94 - 111 mmol/L Final    CO2 03/07/2022 28  21 - 33 mmol/L Final    Anion gap 03/07/2022 10  mmol/L Final    Glucose 03/07/2022 86  70 - 110 mg/dL Final    BUN 03/07/2022 18  9 - 21 mg/dL Final    Creatinine 03/07/2022 0.80  0.8 - 1.50 mg/dL Final    BUN/Creatinine ratio 03/07/2022 23    Final    GFR est AA 03/07/2022 >60  ml/min/1.73m2 Final    GFR est non-AA 03/07/2022 >60  ml/min/1.73m2 Final    Comment: Estimated GFR is calculated using the IDMS-traceable Modification of Diet in Renal Disease (MDRD) Study equation, reported for both  Americans (GFRAA) and non- Americans (GFRNA), and normalized to 1.73m2 body surface area. The physician must decide which value applies to the patient. The MDRD study equation should only be used in individuals age 25 or older. It has not been validated for the following: pregnant women, patients with serious comorbid conditions, or on certain medications, or persons with extremes of body size, muscle mass, or nutritional status.  Calcium 03/07/2022 9.2  8.5 - 10.5 mg/dL Final    Bilirubin, total 03/07/2022 1.1* 0.2 - 1.0 mg/dL Final    AST (SGOT) 03/07/2022 20  17 - 74 U/L Final    ALT (SGPT) 03/07/2022 28  3 - 72 U/L Final    Alk. phosphatase 03/07/2022 72  38 - 126 U/L Final    Protein, total 03/07/2022 7.0  6.1 - 8.4 g/dL Final    Albumin 03/07/2022 3.8  3.5 - 4.7 g/dL Final    Globulin 03/07/2022 3.2  g/dL Final    A-G Ratio 03/07/2022 1.2    Final    TSH 03/07/2022 2.21  0.35 - 6.20 uIU/mL Final    Comment:    Due to TSH heterogeneity, both structurally and degree of glycosylation, monoclonal antibodies used in the TSH assay may not accurately quantitate TSH. Therefore, this result should be correlated with clinical findings as well as with other assessments of thyroid function, e.g., free T4, free T3. Vitamin D=10.5 on 3/7/2022. Will prescribe Ergocalciferol at this visit. Patient reports no pain or discomfort at this time. Assessment/Plan:    Assessment/Plan:    1.   Essential hypertension. Continue Lisinopril 20 mg tablet daily for management of essential hypertension. 2.  Erectile dysfunction. Continue Sildenafil 25 mg tablet daily as needed for management of erectile dysfunction. I have discussed the diagnosis with the patient and the intended plan as seen in the above orders. The patient has received an after-visit summary and questions were answered concerning future plans. I have discussed medication side effects and warnings with the patient as well. I have reviewed the plan of care with the patient, accepted their input and they are in agreement with the treatment goals.        Maykel Navarrete NP  March 18, 2022

## 2022-03-19 PROBLEM — I83.93 VARICOSE VEINS OF BOTH LOWER EXTREMITIES: Status: ACTIVE | Noted: 2021-03-16

## 2022-03-19 PROBLEM — I10 ESSENTIAL HYPERTENSION: Status: ACTIVE | Noted: 2020-07-09

## 2022-03-19 PROBLEM — J01.90 ACUTE SINUSITIS: Status: ACTIVE | Noted: 2020-07-09

## 2022-03-19 PROBLEM — E66.01 OBESITY, MORBID (HCC): Status: ACTIVE | Noted: 2020-09-03

## 2022-03-19 PROBLEM — M25.50 MULTIPLE JOINT PAIN: Status: ACTIVE | Noted: 2020-07-09

## 2022-03-19 PROBLEM — J30.9 ALLERGIC RHINITIS: Status: ACTIVE | Noted: 2020-07-09

## 2022-03-19 PROBLEM — L30.9 DERMATITIS: Status: ACTIVE | Noted: 2021-03-16

## 2022-04-01 DIAGNOSIS — I10 ESSENTIAL HYPERTENSION: ICD-10-CM

## 2022-04-01 RX ORDER — LISINOPRIL 20 MG/1
TABLET ORAL
Qty: 90 TABLET | Refills: 3 | Status: SHIPPED | OUTPATIENT
Start: 2022-04-01

## 2023-01-05 ENCOUNTER — HOSPITAL ENCOUNTER (EMERGENCY)
Age: 48
Discharge: HOME | End: 2023-01-05
Attending: EMERGENCY MEDICINE
Payer: COMMERCIAL

## 2023-01-05 VITALS
SYSTOLIC BLOOD PRESSURE: 152 MMHG | RESPIRATION RATE: 18 BRPM | BODY MASS INDEX: 43.67 KG/M2 | DIASTOLIC BLOOD PRESSURE: 94 MMHG | WEIGHT: 305 LBS | HEIGHT: 70 IN | OXYGEN SATURATION: 97 % | HEART RATE: 77 BPM | TEMPERATURE: 98 F
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
## 2023-01-05 DIAGNOSIS — M54.50 LOW BACK PAIN WITHOUT SCIATICA, UNSPECIFIED BACK PAIN LATERALITY, UNSPECIFIED CHRONICITY: ICD-10-CM

## 2023-01-05 PROCEDURE — 99283 EMERGENCY DEPT VISIT LOW MDM: CPT

## 2023-01-05 RX ORDER — KETOROLAC TROMETHAMINE 10 MG/1
10 TABLET, FILM COATED ORAL
Qty: 15 TABLET | Refills: 0 | Status: SHIPPED | OUTPATIENT
Start: 2023-01-05 | End: 2023-01-05 | Stop reason: SDUPTHER

## 2023-01-05 RX ORDER — CYCLOBENZAPRINE HCL 10 MG
10 TABLET ORAL
Qty: 20 TABLET | Refills: 0 | Status: SHIPPED | OUTPATIENT
Start: 2023-01-05 | End: 2023-01-05 | Stop reason: SDUPTHER

## 2023-01-05 RX ORDER — CYCLOBENZAPRINE HCL 10 MG
10 TABLET ORAL
Qty: 20 TABLET | Refills: 0 | Status: SHIPPED | OUTPATIENT
Start: 2023-01-05

## 2023-01-05 RX ORDER — KETOROLAC TROMETHAMINE 10 MG/1
10 TABLET, FILM COATED ORAL
Qty: 15 TABLET | Refills: 0 | Status: SHIPPED | OUTPATIENT
Start: 2023-01-05

## 2023-01-06 ENCOUNTER — PATIENT OUTREACH (OUTPATIENT)
Dept: OTHER | Age: 48
End: 2023-01-06
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
## 2023-02-17 ENCOUNTER — OFFICE VISIT (OUTPATIENT)
Facility: CLINIC | Age: 48
End: 2023-02-17
Payer: COMMERCIAL

## 2023-02-17 VITALS
TEMPERATURE: 97.9 F | HEIGHT: 70 IN | OXYGEN SATURATION: 95 % | HEART RATE: 79 BPM | BODY MASS INDEX: 45.1 KG/M2 | DIASTOLIC BLOOD PRESSURE: 80 MMHG | WEIGHT: 315 LBS | RESPIRATION RATE: 18 BRPM | SYSTOLIC BLOOD PRESSURE: 120 MMHG

## 2023-02-17 DIAGNOSIS — M79.604 LUMBAR PAIN WITH RADIATION DOWN BOTH LEGS: Primary | ICD-10-CM
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
## 2023-02-17 DIAGNOSIS — M79.605 LUMBAR PAIN WITH RADIATION DOWN BOTH LEGS: Primary | ICD-10-CM

## 2023-02-17 DIAGNOSIS — M54.50 LUMBAR PAIN WITH RADIATION DOWN BOTH LEGS: Primary | ICD-10-CM

## 2023-02-17 DIAGNOSIS — M48.061 SPINAL STENOSIS OF LUMBAR REGION WITHOUT NEUROGENIC CLAUDICATION: ICD-10-CM

## 2023-02-17 DIAGNOSIS — I10 ESSENTIAL (PRIMARY) HYPERTENSION: ICD-10-CM

## 2023-02-17 PROCEDURE — 3074F SYST BP LT 130 MM HG: CPT | Performed by: NURSE PRACTITIONER

## 2023-02-17 PROCEDURE — 99214 OFFICE O/P EST MOD 30 MIN: CPT | Performed by: NURSE PRACTITIONER

## 2023-02-17 PROCEDURE — 3078F DIAST BP <80 MM HG: CPT | Performed by: NURSE PRACTITIONER

## 2023-02-17 RX ORDER — PREDNISONE 20 MG/1
TABLET ORAL
Qty: 20 TABLET | Refills: 0 | Status: SHIPPED | OUTPATIENT
Start: 2023-02-17

## 2023-02-17 SDOH — ECONOMIC STABILITY: INCOME INSECURITY: HOW HARD IS IT FOR YOU TO PAY FOR THE VERY BASICS LIKE FOOD, HOUSING, MEDICAL CARE, AND HEATING?: NOT HARD AT ALL

## 2023-02-17 SDOH — ECONOMIC STABILITY: FOOD INSECURITY: WITHIN THE PAST 12 MONTHS, YOU WORRIED THAT YOUR FOOD WOULD RUN OUT BEFORE YOU GOT MONEY TO BUY MORE.: NEVER TRUE

## 2023-02-17 SDOH — ECONOMIC STABILITY: HOUSING INSECURITY
IN THE LAST 12 MONTHS, WAS THERE A TIME WHEN YOU DID NOT HAVE A STEADY PLACE TO SLEEP OR SLEPT IN A SHELTER (INCLUDING NOW)?: NO

## 2023-02-17 SDOH — ECONOMIC STABILITY: FOOD INSECURITY: WITHIN THE PAST 12 MONTHS, THE FOOD YOU BOUGHT JUST DIDN'T LAST AND YOU DIDN'T HAVE MONEY TO GET MORE.: NEVER TRUE

## 2023-02-17 ASSESSMENT — PATIENT HEALTH QUESTIONNAIRE - PHQ9
SUM OF ALL RESPONSES TO PHQ QUESTIONS 1-9: 0
SUM OF ALL RESPONSES TO PHQ9 QUESTIONS 1 & 2: 0
1. LITTLE INTEREST OR PLEASURE IN DOING THINGS: 0
2. FEELING DOWN, DEPRESSED OR HOPELESS: 0
SUM OF ALL RESPONSES TO PHQ QUESTIONS 1-9: 0
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
## 2023-02-17 NOTE — PROGRESS NOTES
Janes Vinson presents today for No chief complaint on file.      Is someone accompanying this pt? no    Is the patient using any DME equipment during OV? no    Health Maintenance reviewed and discussed and ordered per Provider.    Health Maintenance Due   Topic Date Due    COVID-19 Vaccine (1) Never done    HIV screen  Never done    DTaP/Tdap/Td vaccine (1 - Tdap) Never done    Colorectal Cancer Screen  Never done    Flu vaccine (1) Never done    A1C test (Diabetic or Prediabetic)  03/07/2023   .      Coordination of Care:  1. \"Have you been to the ER, urgent care clinic since your last visit?  Hospitalized since your last visit?\" Yes    2. \"Have you seen or consulted any other health care providers outside of the [facility] since your last visit?\" No    3. For patients aged 45-75: Has the patient had a colonoscopy? No    If the patient is female:    4. For patients aged 40-74: Has the patient had a mammogram within the past 2 years? N/A based on age/sex    5. For patients aged 21-65: Has the patient had a pap smear? N/A based on age/sex

## 2023-02-20 ENCOUNTER — HOSPITAL ENCOUNTER (OUTPATIENT)
Age: 48
Discharge: HOME | End: 2023-02-23
Payer: COMMERCIAL

## 2023-02-20 DIAGNOSIS — M79.604 LUMBAR PAIN WITH RADIATION DOWN BOTH LEGS: ICD-10-CM
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
## 2023-02-20 DIAGNOSIS — M54.50 LUMBAR PAIN WITH RADIATION DOWN BOTH LEGS: ICD-10-CM

## 2023-02-20 DIAGNOSIS — M79.605 LUMBAR PAIN WITH RADIATION DOWN BOTH LEGS: ICD-10-CM

## 2023-02-20 PROCEDURE — 72110 X-RAY EXAM L-2 SPINE 4/>VWS: CPT

## 2023-03-01 ENCOUNTER — OFFICE VISIT (OUTPATIENT)
Age: 48
End: 2023-03-01
Payer: COMMERCIAL

## 2023-03-01 VITALS
DIASTOLIC BLOOD PRESSURE: 73 MMHG | TEMPERATURE: 97.7 F | HEART RATE: 86 BPM | BODY MASS INDEX: 45.1 KG/M2 | WEIGHT: 315 LBS | SYSTOLIC BLOOD PRESSURE: 119 MMHG | OXYGEN SATURATION: 94 % | HEIGHT: 70 IN

## 2023-03-01 DIAGNOSIS — M54.16 LUMBAR NEURITIS: ICD-10-CM

## 2023-03-01 DIAGNOSIS — M47.816 SPONDYLOSIS WITHOUT MYELOPATHY OR RADICULOPATHY, LUMBAR REGION: ICD-10-CM

## 2023-03-01 DIAGNOSIS — M51.36 DDD (DEGENERATIVE DISC DISEASE), LUMBAR: Primary | ICD-10-CM

## 2023-03-01 PROCEDURE — 3074F SYST BP LT 130 MM HG: CPT | Performed by: PHYSICAL MEDICINE & REHABILITATION

## 2023-03-01 PROCEDURE — 3078F DIAST BP <80 MM HG: CPT | Performed by: PHYSICAL MEDICINE & REHABILITATION

## 2023-03-01 PROCEDURE — 99204 OFFICE O/P NEW MOD 45 MIN: CPT | Performed by: PHYSICAL MEDICINE & REHABILITATION

## 2023-03-01 RX ORDER — MELOXICAM 15 MG/1
15 TABLET ORAL DAILY
Qty: 30 TABLET | Refills: 0 | Status: SHIPPED | OUTPATIENT
Start: 2023-03-01

## 2023-03-01 SDOH — HEALTH STABILITY: PHYSICAL HEALTH: ON AVERAGE, HOW MANY MINUTES DO YOU ENGAGE IN EXERCISE AT THIS LEVEL?: 10 MIN

## 2023-03-01 SDOH — HEALTH STABILITY: PHYSICAL HEALTH: ON AVERAGE, HOW MANY DAYS PER WEEK DO YOU ENGAGE IN MODERATE TO STRENUOUS EXERCISE (LIKE A BRISK WALK)?: 3 DAYS

## 2023-03-01 ASSESSMENT — SOCIAL DETERMINANTS OF HEALTH (SDOH)
WITHIN THE LAST YEAR, HAVE YOU BEEN AFRAID OF YOUR PARTNER OR EX-PARTNER?: NO
WITHIN THE LAST YEAR, HAVE YOU BEEN KICKED, HIT, SLAPPED, OR OTHERWISE PHYSICALLY HURT BY YOUR PARTNER OR EX-PARTNER?: NO
WITHIN THE LAST YEAR, HAVE YOU BEEN HUMILIATED OR EMOTIONALLY ABUSED IN OTHER WAYS BY YOUR PARTNER OR EX-PARTNER?: NO
WITHIN THE LAST YEAR, HAVE TO BEEN RAPED OR FORCED TO HAVE ANY KIND OF SEXUAL ACTIVITY BY YOUR PARTNER OR EX-PARTNER?: NO
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
## 2023-03-13 ENCOUNTER — HOSPITAL ENCOUNTER (OUTPATIENT)
Age: 48
Setting detail: RECURRING SERIES
Discharge: HOME | End: 2023-03-16
Payer: COMMERCIAL

## 2023-03-13 PROCEDURE — 97530 THERAPEUTIC ACTIVITIES: CPT

## 2023-03-13 PROCEDURE — 97162 PT EVAL MOD COMPLEX 30 MIN: CPT
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
## 2023-03-20 ENCOUNTER — HOSPITAL ENCOUNTER (OUTPATIENT)
Age: 48
Setting detail: RECURRING SERIES
Discharge: HOME | End: 2023-03-23
Payer: COMMERCIAL

## 2023-03-20 PROCEDURE — 97110 THERAPEUTIC EXERCISES: CPT

## 2023-03-20 NOTE — PROGRESS NOTES
PT DAILY TREATMENT NOTE     Patient Name: Margret Torres  YXHJ:3/60/0302  : 1975  [x]  Patient  Verified  Payor: Ugo Sil / Plan: Lauro Skelton / Product Type: *No Product type* /    In time:  Out time:1640  Total Treatment Time (min): 48  Total Timed Codes (min): 48  1:1 Treatment Time (min): 48   Visit #: 2     Treatment Area: Other intervertebral disc degeneration, lumbar region [M51.36]    SUBJECTIVE  Pt reports soreness and fatigue in lower back which he attributes to a long day at work. Pain Level (0-10 scale): 5    Any medication changes, allergies to medications, adverse drug reactions, diagnosis change, or new procedure performed?: [x] No    [] Yes (see summary sheet for update)    OBJECTIVE  Modality rationale: Declined   Min Type Additional Details    [] Estim: []Att   []Unatt  []TENS instruct                 []IFC  []Premod   []NMES                       []Other:  []w/US   []w/ice   []w/heat  Position:  Location:    []  Traction: [] Cervical       []Lumbar                       [] Prone          []Supine                       []Intermittent   []Continuous Lbs:  [] before manual  [] after manual    []  Ultrasound: []Continuous   [] Pulsed                           []1MHz   []3MHz Location:  W/cm2:    []  Iontophoresis with dexamethasone         Location: [] Take home patch   [] In clinic    []  Ice     []  heat  []  Ice massage Position:  Location:    []  Vasopneumatic Device Pressure: [] lo [] med [] hi   Temp: [] lo [] med [] hi   [] Skin assessment post-treatment:  []intact []redness- no adverse reaction       []redness - adverse reaction:     48 min Therapeutic Exercise:  [x] See flow sheet :   Rationale: increase ROM, increase strength, improve coordination, improve balance, and increase proprioception to improve the patients ability to complete household and leisure activities pain free.       With TE  TA   NR  GT   Misc Patient Education: [x] Review HEP    []

## 2023-03-24 ENCOUNTER — HOSPITAL ENCOUNTER (OUTPATIENT)
Age: 48
Setting detail: RECURRING SERIES
Discharge: HOME | End: 2023-03-27
Payer: COMMERCIAL

## 2023-03-24 ENCOUNTER — OFFICE VISIT (OUTPATIENT)
Facility: CLINIC | Age: 48
End: 2023-03-24
Payer: COMMERCIAL

## 2023-03-24 VITALS
HEIGHT: 70 IN | BODY MASS INDEX: 45.1 KG/M2 | WEIGHT: 315 LBS | SYSTOLIC BLOOD PRESSURE: 111 MMHG | RESPIRATION RATE: 18 BRPM | DIASTOLIC BLOOD PRESSURE: 77 MMHG | OXYGEN SATURATION: 98 % | HEART RATE: 92 BPM | TEMPERATURE: 98.2 F

## 2023-03-24 DIAGNOSIS — N52.9 ERECTILE DYSFUNCTION, UNSPECIFIED ERECTILE DYSFUNCTION TYPE: ICD-10-CM

## 2023-03-24 DIAGNOSIS — Z12.11 ENCOUNTER FOR COLORECTAL CANCER SCREENING: ICD-10-CM

## 2023-03-24 DIAGNOSIS — Z12.12 ENCOUNTER FOR COLORECTAL CANCER SCREENING: ICD-10-CM

## 2023-03-24 DIAGNOSIS — I10 ESSENTIAL HYPERTENSION: ICD-10-CM

## 2023-03-24 DIAGNOSIS — R68.89 OTHER GENERAL SYMPTOMS AND SIGNS: ICD-10-CM

## 2023-03-24 DIAGNOSIS — I10 ESSENTIAL (PRIMARY) HYPERTENSION: Primary | ICD-10-CM

## 2023-03-24 DIAGNOSIS — E55.9 VITAMIN D DEFICIENCY, UNSPECIFIED: ICD-10-CM

## 2023-03-24 DIAGNOSIS — N41.9 PROSTATITIS, UNSPECIFIED PROSTATITIS TYPE: ICD-10-CM

## 2023-03-24 PROCEDURE — 3078F DIAST BP <80 MM HG: CPT | Performed by: NURSE PRACTITIONER

## 2023-03-24 PROCEDURE — 97110 THERAPEUTIC EXERCISES: CPT

## 2023-03-24 PROCEDURE — 3074F SYST BP LT 130 MM HG: CPT | Performed by: NURSE PRACTITIONER

## 2023-03-24 PROCEDURE — 99214 OFFICE O/P EST MOD 30 MIN: CPT | Performed by: NURSE PRACTITIONER

## 2023-03-24 RX ORDER — LISINOPRIL 20 MG/1
20 TABLET ORAL DAILY
Qty: 90 TABLET | Refills: 0 | Status: SHIPPED | OUTPATIENT
Start: 2023-03-24

## 2023-03-24 RX ORDER — SILDENAFIL 25 MG/1
25 TABLET, FILM COATED ORAL DAILY PRN
Qty: 30 TABLET | Refills: 0 | Status: SHIPPED | OUTPATIENT
Start: 2023-03-24

## 2023-03-24 RX ORDER — ERGOCALCIFEROL 1.25 MG/1
50000 CAPSULE ORAL
Qty: 13 CAPSULE | Refills: 0 | Status: SHIPPED | OUTPATIENT
Start: 2023-03-24

## 2023-03-24 NOTE — PROGRESS NOTES
Margret Torres presents today for   Chief Complaint   Patient presents with    Annual Exam     Pt here for physical        Is someone accompanying this pt? no    Is the patient using any DME equipment during OV? no    Health Maintenance reviewed and discussed and ordered per Provider. Health Maintenance Due   Topic Date Due    HIV screen  Never done    DTaP/Tdap/Td vaccine (1 - Tdap) Never done    Colorectal Cancer Screen  Never done    COVID-19 Vaccine (4 - Booster for Moderna series) 03/03/2022    A1C test (Diabetic or Prediabetic)  03/07/2023   . Coordination of Care:  1. \"Have you been to the ER, urgent care clinic since your last visit? Hospitalized since your last visit? \" No    2. \"Have you seen or consulted any other health care providers outside of the [address] [place] [address] since your last visit? \" No    3. For patients aged 39-70: Has the patient had a colonoscopy? No    If the patient is female:    4. For patients aged 41-77: Has the patient had a mammogram within the past 2 years? N/A based on age/sex    5. For patients aged 21-65: Has the patient had a pap smear?  N/A based on age/sex
K/UL Final    Basophils Absolute 03/07/2022 0.1  0.0 - 0.1 K/UL Final    Granulocyte Absolute Count 03/07/2022 0.0  0.00 - 0.04 K/UL Final    Differential Type 03/07/2022 AUTOMATED    Final    Hemoglobin A1C 03/07/2022 5.8 (A)  4.2 - 5.6 % Final    Comment: (NOTE)  HbA1C Interpretive Ranges  <5.7              Normal  5.7 - 6.4         Consider Prediabetes  >6.5              Consider Diabetes      eAG 03/07/2022 120  mg/dL Final    Comment: (NOTE)  The eAG should be interpreted with patient characteristics in mind   since ethnicity, interindividual differences, red cell lifespan,   variation in rates of glycation, etc. may affect the validity of the   calculation. Pros. Spec. Antigen 03/07/2022 1.3  0.0 - 4.0 ng/mL Final    LIPID PANEL 03/07/2022      Final    Cholesterol, Total 03/07/2022 145  <200 mg/dL Final    Triglycerides 03/07/2022 102  <150 mg/dL Final    Comment: The drugs N-acetylcysteine (NAC) and  Metamiszole have been found to cause falsely  low results in this chemical assay. Please  be sure to submit blood samples obtained  BEFORE administration of either of these  drugs to assure correct results. HDL 03/07/2022 43  40 - 60 mg/dL Final    LDL Calculated 03/07/2022 81.6  0 - 100 mg/dL Final    VLDL Cholesterol Calculated 03/07/2022 20.4  mg/dL Final    Chol/HDL Ratio 03/07/2022 3.4  0 - 5.0   Final    Vit D, 25-Hydroxy 03/07/2022 10.5 (A)  30 - 100 ng/mL Final    Comment: (NOTE)  Deficiency               <20 ng/mL  Insufficiency          20-30 ng/mL  Sufficient             ng/mL  Possible toxicity       >100 ng/mL    The Method used is Siemens Advia Centaur currently standardized to a   Center of Disease Control and Prevention (CDC) certified reference   [address]. Samples containing fluorescein dye can produce falsely   elevated values when tested with the ADVIA Centaur Vitamin D Assay.    It is recommended that results in the toxic range, >100 ng/mL, be   retested 72 hours post fluorescein

## 2023-03-25 NOTE — PROGRESS NOTES
PT DAILY TREATMENT NOTE     Patient Name: Christin Marin  Date:3/25/2023  : 1975  [x]  Patient  Verified  Payor: Chantelle Jackman / Plan: TK [address] / Product Type: *No Product type* /    In time:1301  Out time:1351  Total Treatment Time (min): 50  Total Timed Codes (min): 40  1:1 Treatment Time (min): 40   Visit #: 3     Treatment Area: Other intervertebral disc degeneration, lumbar region [M51.36]    SUBJECTIVE  Pt reported that he was sore today and a nagging pain . Pain Level (0-10 scale): 5    Any medication changes, allergies to medications, adverse drug reactions, diagnosis change, or new procedure performed?: [x] No    [] Yes (see summary sheet for update)        OBJECTIVE  Modality rationale: decrease inflammation, decrease pain, and increase tissue extensibility to improve the patients ability to To perform daily exercises with improved mobility and decreased pain.      Min Type Additional Details    [] Estim: []Att   []Unatt  []TENS instruct                 []IFC  []Premod []NMES                       []Other:  []w/US   []w/ice   []w/heat  Position:  Location:    []  Traction: [] Cervical       []Lumbar                       [] Prone          []Supine                       []Intermittent   []Continuous Lbs:  [] before manual  [] after manual    []  Ultrasound: []Continuous   [] Pulsed                           []1MHz   []3MHz Location:  W/cm2:    []  Iontophoresis with dexamethasone         Location: [] Take home patch   [] In clinic   10 []  Ice     [x]  heat  []  Ice massage Position:seated  Location:lumbar spine    []  Vasopneumatic Device Pressure: [] lo [] med [] hi   Temp: [] lo [] med [] hi   [] Skin assessment post-treatment:  []intact []redness- no adverse reaction       []redness - adverse reaction:           40 min Therapeutic Exercise:  [x] See flow sheet :   Rationale: increase strength, improve coordination, and improve balance to improve the patients ability to

## 2023-03-27 ENCOUNTER — HOSPITAL ENCOUNTER (OUTPATIENT)
Age: 48
Setting detail: RECURRING SERIES
Discharge: HOME | End: 2023-03-30
Payer: COMMERCIAL

## 2023-03-27 PROCEDURE — 97110 THERAPEUTIC EXERCISES: CPT

## 2023-03-27 NOTE — PROGRESS NOTES
and return to community events, and/or work. min Therapeutic Activity:  []  See flow sheet :   Rationale:       min Neuromuscular Re-education:  []  See flow sheet :   Rationale:      min Manual Therapy:     Rationale:      min Gait Training:  ___ feet with ___ device on level surfaces with ___ level of assist   Rationale: With TE  TA   NR  GT   [facility] Patient Education: [x] Review HEP    [] Progressed/Changed HEP based on:   [] positioning   [] body mechanics   [] transfers   [] heat/ice application          Pain Level (0-10 scale) post treatment: 3    ASSESSMENT/Changes in Function:  Began session with warm up on stepper, followed by seated stretches via hamstring and piriformis B. Supine exercises to promote lumbar stretching and stability as documented on flow sheet. Mobility improved post exercise via ambulation and positional changes off mat. Progress as able. Patient will continue to benefit from skilled PT services to modify and progress therapeutic interventions, analyze and address functional mobility deficits, analyze and address ROM deficits, analyze and address strength deficits, analyze and cue for proper movement patterns, and analyze and modify for postural abnormalities to attain remaining goals.        [x]  See Plan of Care  []  See progress note/recertification  []  See Discharge Summary           PLAN  [x]  Upgrade activities as tolerated     [x]  Continue plan of care  []  Update interventions per flow sheet       []  Discharge due to:_  []  Other:_      Patria Castro, GEORGE   3/27/2023 4:54 PM

## 2023-03-29 ENCOUNTER — HOSPITAL ENCOUNTER (OUTPATIENT)
Age: 48
Setting detail: RECURRING SERIES
Discharge: HOME | End: 2023-04-01
Payer: COMMERCIAL

## 2023-03-29 PROCEDURE — 97110 THERAPEUTIC EXERCISES: CPT

## 2023-03-29 NOTE — PROGRESS NOTES
and return to community events, and/or work. min Therapeutic Activity:  []  See flow sheet :   Rationale:       min Neuromuscular Re-education:  []  See flow sheet :   Rationale:      min Manual Therapy:     Rationale:      min Gait Training:  ___ feet with ___ device on level surfaces with ___ level of assist   Rationale: With IFEANYI LING   NR  GT   [facility] Patient Education: [x] Review HEP    [] Progressed/Changed HEP based on:   [] positioning   [] body mechanics   [] transfers   [] heat/ice application          Pain Level (0-10 scale) post treatment: 3    ASSESSMENT/Changes in Function:  Began session with warm up on stepper, followed by stretches via hamstring on step,  and piriformis B in supine. Supine exercises to promote lumbar stretching and stability as documented on flow sheet. Added ball squeeze bt knees with bridging, and increase tband to black for clams. Mobility improved post exercise via ambulation and positional changes off mat. Progress as able. Patient will continue to benefit from skilled PT services to modify and progress therapeutic interventions, analyze and address functional mobility deficits, analyze and address ROM deficits, analyze and address strength deficits, analyze and cue for proper movement patterns, and analyze and modify for postural abnormalities to attain remaining goals.        [x]  See Plan of Care  []  See progress note/recertification  []  See Discharge Summary           PLAN  [x]  Upgrade activities as tolerated     [x]  Continue plan of care  []  Update interventions per flow sheet       []  Discharge due to:_  []  Other:_      Mindy Banks, GEORGE   3/29/2023 4:54 PM

## 2023-04-03 ENCOUNTER — APPOINTMENT (OUTPATIENT)
Age: 48
End: 2023-04-03
Payer: COMMERCIAL

## 2023-04-13 ENCOUNTER — HOSPITAL ENCOUNTER (OUTPATIENT)
Age: 48
Setting detail: RECURRING SERIES
Discharge: HOME | End: 2023-04-16
Payer: COMMERCIAL

## 2023-04-13 PROCEDURE — 97110 THERAPEUTIC EXERCISES: CPT

## 2023-04-17 ENCOUNTER — HOSPITAL ENCOUNTER (OUTPATIENT)
Age: 48
Setting detail: RECURRING SERIES
Discharge: HOME | End: 2023-04-20
Payer: COMMERCIAL

## 2023-04-17 PROCEDURE — 97110 THERAPEUTIC EXERCISES: CPT

## 2023-04-17 NOTE — PROGRESS NOTES
PT DAILY TREATMENT NOTE     Patient Name: Vivi Ortega  Date:2023  : 1975  [x]  Patient  Verified  Payor: Anna Mathias / Plan: Destiny Eugene / Product Type: *No Product type* /    In time:  Out time: 172  Total Treatment Time (min): 64  Total Timed Codes (min): 64  1:1 Treatment Time (min): 64  Visit #: 9    Treatment Area: Other intervertebral disc degeneration, lumbar region [M51.36]    SUBJECTIVE  Pt reported that he was doing ok today,pain is not too bad. He indicated by pointing that he still is having pain under his ribs to his hip on the right side of his lower back. He reports the pain goes to the hip but not past the hip. He is waiting for a date for an MRI. 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 Anything I do when my feet are not flat on the floor, and I made a twisting motion my back hurts along the belt line on both sides. \"    Pain Level (0-10 scale): 4    Any medication changes, allergies to medications, adverse drug reactions, diagnosis change, or new procedure performed?: [x] No    [] Yes (see summary sheet for update)        OBJECTIVE  Modality rationale:    Pt declined     Min Type Additional Details    [] Estim: []Att   []Unatt  []TENS instruct                 []IFC  []Premod []NMES                       []Other:  []w/US   []w/ice   []w/heat  Position:  Location:    []  Traction: [] Cervical       []Lumbar                       [] Prone          []Supine                       []Intermittent   []Continuous Lbs:  [] before manual  [] after manual    []  Ultrasound: []Continuous   [] Pulsed                           []1MHz   []3MHz Location:  W/cm2:    []  Iontophoresis with dexamethasone         Location: [] Take home patch   [] In clinic    []  Ice     []  Heat   (post session)  []  Ice massage Position:SL  Location:R hip/lumbar spine    []  Vasopneumatic Device Pressure: [] lo [] med [] hi   Temp: [] lo [] med [] hi   [] Skin

## 2023-04-19 ENCOUNTER — HOSPITAL ENCOUNTER (OUTPATIENT)
Age: 48
Setting detail: RECURRING SERIES
Discharge: HOME | End: 2023-04-22
Payer: COMMERCIAL

## 2023-04-19 PROCEDURE — 97164 PT RE-EVAL EST PLAN CARE: CPT

## 2023-04-19 PROCEDURE — 97110 THERAPEUTIC EXERCISES: CPT

## 2023-04-19 NOTE — PROGRESS NOTES
PT DAILY TREATMENT NOTE     Patient Name: Chandler Butterfield  Date:2023  : 1975  [x]  Patient  Verified  Payor: Marshall Monday / Plan: Haydee Holder / Product Type: *No Product type* /    In time: 1530  Out time: 1624  Total Treatment Time (min): 56  Total Timed Codes (min): 41  1:1 Treatment Time (min): 56   Visit # 10      Treatment Area:  Other intervertebral disc degeneration, lumbar region [M51.36]    SUBJECTIVE  ***  Pain Level (0-10 scale): 10  Any medication changes, allergies to medications, adverse drug reactions, diagnosis change, or new procedure performed?: [x] No    [] Yes (see summary sheet for update)        OBJECTIVE    Modality rationale: {BSHSI INMOTION MODALITIES:21675} to improve the patients ability to ***   Min Type Additional Details    [] Estim:  []Unatt       []IFC  []Premod                        []Other:  []w/ice   []w/heat  Position:  Location:    [] Estim: []Att    []TENS instruct  []NMES                    []Other:  []w/US   []w/ice   []w/heat  Position:  Location:         []  Ultrasound: []Continuous   [] Pulsed                           []1MHz   []3MHz Location:  W/cm2:         []  Ice     []  heat  []  Ice massage  []  Laser   []  Anodyne Position:  Location:         []  Vasopneumatic Device Pressure:       [] lo [] med [] hi   Temperature: [] lo [] med [] hi   [] Skin assessment post-treatment:  []intact []redness- no adverse reaction    []redness - adverse reaction:     15 min []Eval                  [x]Re-Eval       *** min Therapeutic Exercise:  [] See flow sheet :   Rationale: {BSHSI IMMOTION THER EX:24993:a} to improve the patients ability to ***    *** min Therapeutic Activity:  []  See flow sheet :   Rationale: {BSHSI IMMOTION THER EX:40916:a}  to improve the patients ability to ***     *** min Neuromuscular Re-education:  []  See flow sheet :   Rationale: {BSHSI IMMOTION THER EX:58141:a}  to improve the patients ability to ***    *** min Manual Therapy:  ***

## 2023-04-28 ENCOUNTER — HOSPITAL ENCOUNTER (OUTPATIENT)
Age: 48
Discharge: HOME | End: 2023-04-28
Payer: COMMERCIAL

## 2023-04-28 DIAGNOSIS — M51.36 DDD (DEGENERATIVE DISC DISEASE), LUMBAR: ICD-10-CM

## 2023-04-28 DIAGNOSIS — M47.816 SPONDYLOSIS WITHOUT MYELOPATHY OR RADICULOPATHY, LUMBAR REGION: ICD-10-CM

## 2023-04-28 DIAGNOSIS — M54.16 LUMBAR NEURITIS: ICD-10-CM

## 2023-04-28 PROCEDURE — 72148 MRI LUMBAR SPINE W/O DYE: CPT

## 2023-05-24 ENCOUNTER — OFFICE VISIT (OUTPATIENT)
Age: 48
End: 2023-05-24
Payer: COMMERCIAL

## 2023-05-24 VITALS
OXYGEN SATURATION: 95 % | HEIGHT: 70 IN | BODY MASS INDEX: 45.1 KG/M2 | HEART RATE: 86 BPM | DIASTOLIC BLOOD PRESSURE: 75 MMHG | TEMPERATURE: 97.7 F | SYSTOLIC BLOOD PRESSURE: 109 MMHG | WEIGHT: 315 LBS

## 2023-05-24 DIAGNOSIS — M48.061 FORAMINAL STENOSIS OF LUMBAR REGION: ICD-10-CM

## 2023-05-24 DIAGNOSIS — M54.16 LUMBAR NEURITIS: ICD-10-CM

## 2023-05-24 DIAGNOSIS — M51.36 DDD (DEGENERATIVE DISC DISEASE), LUMBAR: Primary | ICD-10-CM

## 2023-05-24 DIAGNOSIS — M51.26 HNP (HERNIATED NUCLEUS PULPOSUS), LUMBAR: ICD-10-CM

## 2023-05-24 DIAGNOSIS — M47.819 FACET ARTHROPATHY: ICD-10-CM

## 2023-05-24 DIAGNOSIS — M47.817 LUMBOSACRAL SPONDYLOSIS WITHOUT MYELOPATHY: ICD-10-CM

## 2023-05-24 PROCEDURE — 99214 OFFICE O/P EST MOD 30 MIN: CPT | Performed by: PHYSICAL MEDICINE & REHABILITATION

## 2023-05-24 PROCEDURE — 3074F SYST BP LT 130 MM HG: CPT | Performed by: PHYSICAL MEDICINE & REHABILITATION

## 2023-05-24 PROCEDURE — 3078F DIAST BP <80 MM HG: CPT | Performed by: PHYSICAL MEDICINE & REHABILITATION

## 2023-05-24 NOTE — PROGRESS NOTES
Codeine Nausea And Vomiting          PHYSICAL EXAMINATION    /75 (Site: Right Upper Arm, Position: Sitting, Cuff Size: Large Adult)   Pulse 86   Temp 97.7 °F (36.5 °C) (Skin)   Ht 5' 10\" (1.778 m)   Wt (!) 319 lb (144.7 kg)   SpO2 95%   BMI 45.77 kg/m²     CONSTITUTIONAL: NAD, A&O x 3  SENSATION:  Sensation is intact to light touch throughout. MOTOR:  Straight Leg Raise: Negative, bilateral    Some tenderness with palpation to the right trochanteric bursa. Ambulates without an assistive device     Hip Flex Knee Ext Knee Flex Ankle DF GTE Ankle PF Tone   Right +4/5 +4/5 +4/5 +4/5 +4/5 +4/5 +4/5   Left +4/5 +4/5 +4/5 +4/5 +4/5 +4/5 +4/5       ASSESSMENT   Jose Linder was seen today for back problem. Diagnoses and all orders for this visit:    DDD (degenerative disc disease), lumbar    Lumbosacral spondylosis without myelopathy    Lumbar neuritis    Foraminal stenosis of lumbar region    HNP (herniated nucleus pulposus), lumbar    Facet arthropathy          IMPRESSION AND PLAN:  Patient returns to the office today with c/o right lateral hip and right buttocks pain. Multiple treatment options were discussed. Patient is not interested in surgery or injection at this time. I recommended the patient continue to perform his HEP everyday. I discussed starting him on neuropathic medication, pt declined. Patient says his pain is tolerable at this time and he is not interested in further treatment at this time. Patient is neurologically intact. I will see the patient back as needed. Written by Kevin Nice, as dictated by Suzie Reyes MD  I examined the patient, reviewed and agree with the note.

## 2023-06-13 DIAGNOSIS — Z12.11 ENCOUNTER FOR SCREENING COLONOSCOPY: Primary | ICD-10-CM

## 2023-06-21 RX ORDER — POLYETHYLENE GLYCOL 3350, SODIUM SULFATE ANHYDROUS, SODIUM BICARBONATE, SODIUM CHLORIDE, POTASSIUM CHLORIDE 236; 22.74; 6.74; 5.86; 2.97 G/4L; G/4L; G/4L; G/4L; G/4L
4 POWDER, FOR SOLUTION ORAL ONCE
Qty: 4000 ML | Refills: 0 | Status: SHIPPED | OUTPATIENT
Start: 2023-06-21 | End: 2023-06-21

## 2023-06-22 ENCOUNTER — ANESTHESIA EVENT (OUTPATIENT)
Age: 48
End: 2023-06-22
Payer: COMMERCIAL

## 2023-06-27 DIAGNOSIS — I10 ESSENTIAL (PRIMARY) HYPERTENSION: ICD-10-CM

## 2023-06-29 ENCOUNTER — PREP FOR PROCEDURE (OUTPATIENT)
Age: 48
End: 2023-06-29

## 2023-06-29 DIAGNOSIS — Z12.11 COLON CANCER SCREENING: Primary | ICD-10-CM

## 2023-06-29 RX ORDER — SODIUM CHLORIDE, SODIUM LACTATE, POTASSIUM CHLORIDE, CALCIUM CHLORIDE 600; 310; 30; 20 MG/100ML; MG/100ML; MG/100ML; MG/100ML
INJECTION, SOLUTION INTRAVENOUS CONTINUOUS
Status: CANCELLED | OUTPATIENT
Start: 2023-06-29

## 2023-06-29 RX ORDER — LISINOPRIL 20 MG/1
20 TABLET ORAL DAILY
Qty: 90 TABLET | Refills: 3 | Status: SHIPPED | OUTPATIENT
Start: 2023-06-29

## 2023-07-06 ENCOUNTER — HOSPITAL ENCOUNTER (OUTPATIENT)
Age: 48
Setting detail: OUTPATIENT SURGERY
Discharge: HOME | End: 2023-07-06
Attending: INTERNAL MEDICINE | Admitting: INTERNAL MEDICINE
Payer: COMMERCIAL

## 2023-07-06 ENCOUNTER — ANESTHESIA (OUTPATIENT)
Age: 48
End: 2023-07-06
Payer: COMMERCIAL

## 2023-07-06 VITALS
DIASTOLIC BLOOD PRESSURE: 67 MMHG | BODY MASS INDEX: 44.38 KG/M2 | HEART RATE: 66 BPM | WEIGHT: 310 LBS | HEIGHT: 70 IN | SYSTOLIC BLOOD PRESSURE: 133 MMHG | RESPIRATION RATE: 16 BRPM | OXYGEN SATURATION: 97 % | TEMPERATURE: 97 F

## 2023-07-06 DIAGNOSIS — Z12.11 COLON CANCER SCREENING: ICD-10-CM

## 2023-07-06 PROCEDURE — 2709999900 HC NON-CHARGEABLE SUPPLY: Performed by: INTERNAL MEDICINE

## 2023-07-06 PROCEDURE — 45385 COLONOSCOPY W/LESION REMOVAL: CPT | Performed by: INTERNAL MEDICINE

## 2023-07-06 PROCEDURE — 45388 COLONOSCOPY W/ABLATION: CPT | Performed by: INTERNAL MEDICINE

## 2023-07-06 PROCEDURE — 3600007512: Performed by: INTERNAL MEDICINE

## 2023-07-06 PROCEDURE — 7100000010 HC PHASE II RECOVERY - FIRST 15 MIN: Performed by: INTERNAL MEDICINE

## 2023-07-06 PROCEDURE — 3600007502: Performed by: INTERNAL MEDICINE

## 2023-07-06 PROCEDURE — 7100000011 HC PHASE II RECOVERY - ADDTL 15 MIN: Performed by: INTERNAL MEDICINE

## 2023-07-06 PROCEDURE — 88305 TISSUE EXAM BY PATHOLOGIST: CPT

## 2023-07-06 PROCEDURE — 2580000003 HC RX 258: Performed by: NURSE ANESTHETIST, CERTIFIED REGISTERED

## 2023-07-06 PROCEDURE — 3700000001 HC ADD 15 MINUTES (ANESTHESIA): Performed by: INTERNAL MEDICINE

## 2023-07-06 PROCEDURE — C1713 ANCHOR/SCREW BN/BN,TIS/BN: HCPCS | Performed by: INTERNAL MEDICINE

## 2023-07-06 PROCEDURE — 3700000000 HC ANESTHESIA ATTENDED CARE: Performed by: INTERNAL MEDICINE

## 2023-07-06 PROCEDURE — 2500000003 HC RX 250 WO HCPCS: Performed by: NURSE ANESTHETIST, CERTIFIED REGISTERED

## 2023-07-06 DEVICE — CLIP HEMOSTATIC REPOSITIONABLE 235 CMX16 MM SHTH DURACLIP: Type: IMPLANTABLE DEVICE | Site: CECUM | Status: FUNCTIONAL

## 2023-07-06 DEVICE — CLIP HEMOSTATIC REPOSITIONABLE 235 CMX11 MM DURACLIP: Type: IMPLANTABLE DEVICE | Site: CECUM | Status: FUNCTIONAL

## 2023-07-06 RX ORDER — SODIUM CHLORIDE, SODIUM LACTATE, POTASSIUM CHLORIDE, CALCIUM CHLORIDE 600; 310; 30; 20 MG/100ML; MG/100ML; MG/100ML; MG/100ML
INJECTION, SOLUTION INTRAVENOUS CONTINUOUS
Status: DISCONTINUED | OUTPATIENT
Start: 2023-07-06 | End: 2023-07-06 | Stop reason: HOSPADM

## 2023-07-06 RX ORDER — SODIUM CHLORIDE 0.9 % (FLUSH) 0.9 %
5-40 SYRINGE (ML) INJECTION EVERY 12 HOURS SCHEDULED
Status: DISCONTINUED | OUTPATIENT
Start: 2023-07-06 | End: 2023-07-06 | Stop reason: HOSPADM

## 2023-07-06 RX ADMIN — SODIUM CHLORIDE, POTASSIUM CHLORIDE, SODIUM LACTATE AND CALCIUM CHLORIDE: 600; 310; 30; 20 INJECTION, SOLUTION INTRAVENOUS at 10:26

## 2023-07-06 RX ADMIN — LIDOCAINE HYDROCHLORIDE 50 MG: 20 INJECTION, SOLUTION INFILTRATION; PERINEURAL at 11:28

## 2023-07-06 ASSESSMENT — PAIN - FUNCTIONAL ASSESSMENT: PAIN_FUNCTIONAL_ASSESSMENT: NONE - DENIES PAIN

## 2023-07-06 NOTE — DISCHARGE INSTRUCTIONS
Recommendations:  Check pathology. Will notify patient with results when they are available. Repeat colonoscopy in 3 years for surveillance. Follow up as needed.

## 2023-07-06 NOTE — OP NOTE
Colonoscopy procedure note    Date of service: 7/6/2023    Type: Screening    Indication for procedure: Colon cancer screening    Anesthesia classification: ASA class 2    Patient history and physical been accomplished and documented. Patient is assessed and determined to be appropriate candidate for planned procedure and sedation; patient reassessed immediately prior to sedation. Sedation plan: MAC per anesthesia    Surgical assistant: Not applicable    Airway assessment: Range of motion: Normal, mouth opening, Visual obstruction: No.    UPDATED PREOP EXAM:  Unchanged. VS: Reviewed  Gen: in NAD  CV: RRR, no murmur  Resp: CTA  Abd: Soft, NTND, +BS  Extrem: No cyanosis or edema  Neuro: Awake and alert    Informed consent obtained: Yes. The indications, risks including but not limited to bleeding, perforation, infection, death, and potential failure to visual areas are diagnosed neoplasia, alternatives and benefits were discussed with the patient prior to the procedure. Patient identity and procedure was verified, absent was obtained, and is consistent with the consent form found in the patient's records. PROCEDURE PERFORMED:  COLONOSCOPY  to the cecum with MAC and hot snare polypectomy of sessile cecal polyp/ablation therapy placed 2 hemoclips applied. See below. INSTRUMENT: Olympus colonoscope per nursing notes. FINDINGS:    External anal lesions: Normal   Rectum: normal.   Retroflexion view: Normal  Sigmoid: normal except for diverticulosis. Descending Colon: normal except for diverticulosis. Transverse Colon: normal except for diverticulosis. Ascending Colon: normal except for diverticulosis. Cecum: normal, including the appendiceal orifice and ileocecal valve. Except for diverticulosis. There is also a sessile fingerlike polyp in the cecum at least 1 cm in size or more. It was removed by piecemeal hot snare polypectomy without incident.   Because there was residual polyp tissue

## 2023-07-06 NOTE — ANESTHESIA POSTPROCEDURE EVALUATION
Department of Anesthesiology  Postprocedure Note    Patient: Dwayne Martinez  MRN: [id number]  YOB: 1975  Date of evaluation: 7/6/2023      Procedure Summary     Date: 07/06/23 Room / Location: [facility] ENDO 01 / [facility] ENDOSCOPY    Anesthesia Start: 1116 Anesthesia Stop: 6384    Procedure: COLONOSCOPY with polypectomy (Rectum) Diagnosis:       Special screening for malignant neoplasms, colon      (Special screening for malignant neoplasms, colon [Z12.11])    Surgeons: Dariela Still MD Responsible Provider: LESVIA Magallanes CRNA    Anesthesia Type: MAC ASA Status: 2          Anesthesia Type: MAC    Leeroy Phase I: Leeroy Score: 10    Leeroy Phase II:        Anesthesia Post Evaluation    Patient location during evaluation: bedside  Patient participation: complete - patient participated  Level of consciousness: awake and awake and alert  Pain score: 0  Airway patency: patent  Nausea & Vomiting: no nausea  Complications: no  Cardiovascular status: blood pressure returned to baseline  Respiratory status: acceptable  Hydration status: euvolemic  Multimodal analgesia pain management approach

## 2023-07-06 NOTE — INTERVAL H&P NOTE
Update History & Physical    The patient's History and Physical of July 6, 2023 was reviewed with the patient and I examined the patient. There was no change. The surgical site was confirmed by the patient and me. Plan: The risks, benefits, expected outcome, and alternative to the recommended procedure have been discussed with the patient. Patient understands and wants to proceed with the procedure.      Electronically signed by Thelma Gottlieb MD on 7/6/2023 at 10:35 AM

## 2023-07-11 ENCOUNTER — TELEPHONE (OUTPATIENT)
Age: 48
End: 2023-07-11

## 2023-07-11 NOTE — TELEPHONE ENCOUNTER
----- Message from Arpita Thurman MD sent at 7/11/2023  1:22 PM EDT -----  Colonoscopy pathology results-benign adenomatous polyp(s). Suggest repeat colonoscopy in 5 years time. Please notify patient with results.

## 2023-07-28 DIAGNOSIS — E55.9 VITAMIN D DEFICIENCY, UNSPECIFIED: ICD-10-CM

## 2023-07-28 RX ORDER — ERGOCALCIFEROL 1.25 MG/1
CAPSULE ORAL
Qty: 13 CAPSULE | Refills: 3 | Status: SHIPPED | OUTPATIENT
Start: 2023-07-28

## 2023-12-24 NOTE — PATIENT INSTRUCTIONS
Meniscus Tear: Care Instructions  Overview     The meniscus is rubbery tissue in the knee that acts as a shock absorber between the upper and lower leg bones. The meniscus also keeps your knee stable by spreading weight across it. Each knee has two menisci (plural of meniscus). You can tear a meniscus if you plant your foot and twist, or pivot. The meniscus also can wear down as you age, and it can tear from squatting or kneeling. Small tears may heal on their own with rest and some physical therapy. But a more serious tear may need surgery to repair it or to remove part of the meniscus. Your doctor may want you to see a doctor who specializes in bones and sports injuries. Follow-up care is a key part of your treatment and safety. Be sure to make and go to all appointments, and call your doctor if you are having problems. It's also a good idea to know your test results and keep a list of the medicines you take. How can you care for yourself at home? · Rest your knee when possible. · Do not squat or kneel. · Take pain medicines exactly as directed. ? If the doctor gave you a prescription medicine for pain, take it as prescribed. ? If you are not taking a prescription pain medicine, ask your doctor if you can take an over-the-counter medicine. · Put ice or a cold pack on your knee for 10 to 20 minutes at a time. Try to do this every 1 to 2 hours for the next 3 days (when you are awake) or until the swelling goes down. Put a thin cloth between the ice and your skin. · Prop up the sore leg on a pillow when you ice your knee or any time you sit or lie down during the next 3 days. Try to keep your leg above the level of your heart. This will help reduce swelling. · Follow your doctor's directions for using crutches or a knee brace, if suggested. · Follow your doctor's directions for exercises to keep your knee mobile and your leg muscles strong.  Here are a few exercises you can try if your doctor says it is okay. ? Quad sets: Lie down on the floor or the bed with your injured leg straight. Fully extend your legthere should be no or little bend in your knee. Tighten the thigh (quadriceps) of your injured leg for 6 seconds. Do not lift your heel up. Relax your quadriceps for 10 seconds. Repeat this exercise 8 to 12 times several times during the day. ? Straight-leg raises: Lie down on the floor or the bed with your injured leg flat and your uninjured leg bent so that the bottom of your foot is on the floor or bed. Tighten the quadriceps of your injured leg. Keeping your knee as straight as possible, lift your injured leg off the bed until it is about 18 inches above the bed or floor. Lower your leg back down and relax for 5 seconds. Do 3 sets of 20 repetitions, or if you tire quickly, 3 sets of 8 to 12 repetitions. ? Heel raises: Stand with your feet a few inches apart. Rest your hands lightly on a counter or chair in front of you. Slowly raise your heels off the floor while keeping your knees straight. Hold for 3 seconds, then slowly lower your heels to the floor. Do 3 sets of 8 to 12 repetitions. ? Heel slides: Lie down on the floor or the bed with your leg flat. Slowly begin to slide your heel toward your rear end (buttocks), keeping your heel on the floor. Your knee will begin to bend. Slide your heel and bend your knee until it becomes a little sore and you can feel a small amount of pressure inside your knee. Hold this position for 10 seconds. Slide your heel back down until your leg is straight on the floor. Relax for 10 seconds. Repeat this exercise 20 times. When should you call for help? Watch closely for changes in your health, and be sure to contact your doctor if:    · You have increasing knee pain or swelling or both.     · Your knee is so sore or stiff that you cannot walk on it.     · You do not get better as expected. Where can you learn more?   Go to http://www.gray.com/  Enter [id number] in the search box to learn more about \"Meniscus Tear: Care Instructions. \"  Current as of: July 1, 2021               Content Version: 13.0  © 9253-2789 [employer], [employer]. Care instructions adapted under license by [employer] (which disclaims liability or warranty for this information). If you have questions about a medical condition or this instruction, always ask your healthcare professional. [place] [address] any warranty or liability for your use of this information. No 96

## 2024-03-25 ENCOUNTER — OFFICE VISIT (OUTPATIENT)
Facility: CLINIC | Age: 49
End: 2024-03-25
Payer: COMMERCIAL

## 2024-03-25 VITALS
OXYGEN SATURATION: 95 % | HEART RATE: 84 BPM | BODY MASS INDEX: 45.1 KG/M2 | SYSTOLIC BLOOD PRESSURE: 114 MMHG | TEMPERATURE: 97.8 F | RESPIRATION RATE: 18 BRPM | WEIGHT: 315 LBS | HEIGHT: 70 IN | DIASTOLIC BLOOD PRESSURE: 70 MMHG

## 2024-03-25 DIAGNOSIS — I10 ESSENTIAL (PRIMARY) HYPERTENSION: ICD-10-CM

## 2024-03-25 DIAGNOSIS — E55.9 VITAMIN D DEFICIENCY, UNSPECIFIED: ICD-10-CM

## 2024-03-25 DIAGNOSIS — R79.9 ABNORMAL FINDING OF BLOOD CHEMISTRY, UNSPECIFIED: ICD-10-CM

## 2024-03-25 DIAGNOSIS — I10 ESSENTIAL HYPERTENSION: Primary | ICD-10-CM

## 2024-03-25 DIAGNOSIS — Z13.220 SCREENING CHOLESTEROL LEVEL: ICD-10-CM

## 2024-03-25 DIAGNOSIS — Z11.4 SCREENING FOR HIV WITHOUT PRESENCE OF RISK FACTORS: ICD-10-CM

## 2024-03-25 DIAGNOSIS — R68.89 OTHER GENERAL SYMPTOMS AND SIGNS: ICD-10-CM

## 2024-03-25 DIAGNOSIS — Z12.5 ENCOUNTER FOR SCREENING FOR MALIGNANT NEOPLASM OF PROSTATE: ICD-10-CM

## 2024-03-25 PROCEDURE — 99214 OFFICE O/P EST MOD 30 MIN: CPT | Performed by: NURSE PRACTITIONER

## 2024-03-25 PROCEDURE — 3078F DIAST BP <80 MM HG: CPT | Performed by: NURSE PRACTITIONER

## 2024-03-25 PROCEDURE — 3074F SYST BP LT 130 MM HG: CPT | Performed by: NURSE PRACTITIONER

## 2024-03-25 NOTE — PROGRESS NOTES
revealed no arrhythmias or murmurs.  Bilateral lung sounds clear to auscultation in all fields.  Abdomen soft and nontender, bowel sounds normoactive in all 4 quadrants.  There is no observed bilateral lower extremity edema.     Contact patient if laboratory results require change in treatment.     Assessment/Plan:    Essential hypertension.  Continue Lisinopril 20 mg tablet daily for management of essential hypertension.  Vitamin D deficiency.  Continue Ergocalciferol 50,000 units q. 7 days for management of vitamin D deficiency.      I have discussed the diagnosis with the patient and the intended plan as seen in the above orders.  The patient has received an after-visit summary and questions were answered concerning future plans.  I have discussed medication side effects and warnings with the patient as well. I have reviewed the plan of care with the patient, accepted their input and they are in agreement with the treatment goals.       Corky Herrera, APRN - NP  March 25, 2024

## 2024-03-26 SDOH — ECONOMIC STABILITY: FOOD INSECURITY: WITHIN THE PAST 12 MONTHS, THE FOOD YOU BOUGHT JUST DIDN'T LAST AND YOU DIDN'T HAVE MONEY TO GET MORE.: PATIENT DECLINED

## 2024-03-26 SDOH — ECONOMIC STABILITY: INCOME INSECURITY: HOW HARD IS IT FOR YOU TO PAY FOR THE VERY BASICS LIKE FOOD, HOUSING, MEDICAL CARE, AND HEATING?: PATIENT DECLINED

## 2024-03-26 SDOH — ECONOMIC STABILITY: HOUSING INSECURITY
IN THE LAST 12 MONTHS, WAS THERE A TIME WHEN YOU DID NOT HAVE A STEADY PLACE TO SLEEP OR SLEPT IN A SHELTER (INCLUDING NOW)?: PATIENT DECLINED

## 2024-03-26 SDOH — ECONOMIC STABILITY: FOOD INSECURITY: WITHIN THE PAST 12 MONTHS, YOU WORRIED THAT YOUR FOOD WOULD RUN OUT BEFORE YOU GOT MONEY TO BUY MORE.: PATIENT DECLINED

## 2024-03-26 ASSESSMENT — PATIENT HEALTH QUESTIONNAIRE - PHQ9: DEPRESSION UNABLE TO ASSESS: PT REFUSES

## 2024-04-17 DIAGNOSIS — I10 ESSENTIAL (PRIMARY) HYPERTENSION: ICD-10-CM

## 2024-04-17 RX ORDER — LISINOPRIL 20 MG/1
20 TABLET ORAL DAILY
Qty: 90 TABLET | Refills: 1 | Status: SHIPPED | OUTPATIENT
Start: 2024-04-17

## 2024-05-06 ENCOUNTER — HOSPITAL ENCOUNTER (OUTPATIENT)
Age: 49
Discharge: HOME | End: 2024-05-09
Payer: COMMERCIAL

## 2024-05-06 DIAGNOSIS — R79.9 ABNORMAL FINDING OF BLOOD CHEMISTRY, UNSPECIFIED: ICD-10-CM

## 2024-05-06 DIAGNOSIS — Z11.4 SCREENING FOR HIV WITHOUT PRESENCE OF RISK FACTORS: ICD-10-CM

## 2024-05-06 DIAGNOSIS — I10 ESSENTIAL HYPERTENSION: ICD-10-CM

## 2024-05-06 DIAGNOSIS — Z12.5 ENCOUNTER FOR SCREENING FOR MALIGNANT NEOPLASM OF PROSTATE: ICD-10-CM

## 2024-05-06 DIAGNOSIS — R68.89 OTHER GENERAL SYMPTOMS AND SIGNS: ICD-10-CM

## 2024-05-06 DIAGNOSIS — Z13.220 SCREENING CHOLESTEROL LEVEL: ICD-10-CM

## 2024-05-06 LAB
ALBUMIN SERPL-MCNC: 3.3 G/DL (ref 3.4–5)
ALBUMIN/GLOB SERPL: 0.9 (ref 0.8–1.7)
ALP SERPL-CCNC: 86 U/L (ref 45–117)
ALT SERPL-CCNC: 38 U/L (ref 16–61)
AST SERPL W P-5'-P-CCNC: 15 U/L (ref 10–38)
BASOPHILS # BLD: 0.1 K/UL (ref 0–0.1)
BASOPHILS NFR BLD: 1 % (ref 0–2)
BILIRUB SERPL-MCNC: 1.1 MG/DL (ref 0.2–1)
BUN SERPL-MCNC: 13 MG/DL (ref 7–18)
BUN/CREAT SERPL: 17 (ref 12–20)
CA-I BLD-MCNC: 8.9 MG/DL (ref 8.5–10.1)
CHLORIDE SERPL-SCNC: 107 MMOL/L (ref 100–111)
CHOLEST SERPL-MCNC: 155 MG/DL
CO2 SERPL-SCNC: 28 MMOL/L (ref 21–32)
CREAT SERPL-MCNC: 0.78 MG/DL (ref 0.6–1.3)
DIFFERENTIAL METHOD BLD: ABNORMAL
EOSINOPHIL # BLD: 0.3 K/UL (ref 0–0.4)
EOSINOPHIL NFR BLD: 4 % (ref 0–5)
ERYTHROCYTE [DISTWIDTH] IN BLOOD BY AUTOMATED COUNT: 12.3 % (ref 11.6–14.5)
EST. AVERAGE GLUCOSE BLD GHB EST-MCNC: 120 MG/DL
GLOBULIN SER CALC-MCNC: 3.7 G/DL (ref 2–4)
GLUCOSE SERPL-MCNC: 95 MG/DL (ref 74–99)
HBA1C MFR BLD: 5.8 % (ref 4.2–5.6)
HCT VFR BLD AUTO: 46.6 % (ref 36–48)
HDLC SERPL-MCNC: 41 MG/DL (ref 40–60)
HDLC SERPL: 3.8 (ref 0–5)
HGB BLD-MCNC: 16.1 G/DL (ref 13–16)
IMM GRANULOCYTES # BLD AUTO: 0 K/UL (ref 0–0.04)
IMM GRANULOCYTES NFR BLD AUTO: 0 % (ref 0–0.5)
LDLC SERPL CALC-MCNC: 89.4 MG/DL (ref 0–100)
LIPID PANEL: NORMAL
LYMPHOCYTES # BLD: 1.8 K/UL (ref 0.9–3.6)
LYMPHOCYTES NFR BLD: 24 % (ref 21–52)
MCH RBC QN AUTO: 31 PG (ref 24–34)
MCHC RBC AUTO-ENTMCNC: 34.5 G/DL (ref 31–37)
MCV RBC AUTO: 89.8 FL (ref 78–100)
MONOCYTES # BLD: 0.4 K/UL (ref 0.05–1.2)
MONOCYTES NFR BLD: 5 % (ref 3–10)
NEUTS SEG # BLD: 5.1 K/UL (ref 1.8–8)
NEUTS SEG NFR BLD: 66 % (ref 40–73)
NRBC # BLD: 0 K/UL (ref 0–0.01)
NRBC BLD-RTO: 0 PER 100 WBC
PLATELET # BLD AUTO: 293 K/UL (ref 135–420)
PMV BLD AUTO: 10.1 FL (ref 9.2–11.8)
POTASSIUM SERPL-SCNC: 3.9 MMOL/L (ref 3.5–5.5)
PROT SERPL-MCNC: 7 G/DL (ref 6.4–8.2)
PSA SERPL-MCNC: 1.2 NG/ML (ref 0–4)
RBC # BLD AUTO: 5.19 M/UL (ref 4.35–5.65)
SODIUM SERPL-SCNC: 141 MMOL/L (ref 136–145)
TRIGL SERPL-MCNC: 123 MG/DL
TSH SERPL DL<=0.05 MIU/L-ACNC: 1.13 UIU/ML (ref 0.36–3.74)
VIT B12 SERPL-MCNC: 329 PG/ML (ref 211–911)
VLDLC SERPL CALC-MCNC: 24.6 MG/DL
WBC # BLD AUTO: 7.8 K/UL (ref 4.6–13.2)

## 2024-05-06 PROCEDURE — 82607 VITAMIN B-12: CPT

## 2024-05-06 PROCEDURE — 36415 COLL VENOUS BLD VENIPUNCTURE: CPT

## 2024-05-06 PROCEDURE — 84443 ASSAY THYROID STIM HORMONE: CPT

## 2024-05-06 PROCEDURE — 80061 LIPID PANEL: CPT

## 2024-05-06 PROCEDURE — G0103 PSA SCREENING: HCPCS

## 2024-05-06 PROCEDURE — 87389 HIV-1 AG W/HIV-1&-2 AB AG IA: CPT

## 2024-05-06 PROCEDURE — 83036 HEMOGLOBIN GLYCOSYLATED A1C: CPT

## 2024-05-06 PROCEDURE — 85025 COMPLETE CBC W/AUTO DIFF WBC: CPT

## 2024-05-06 PROCEDURE — 80053 COMPREHEN METABOLIC PANEL: CPT

## 2024-05-07 LAB
HIV 1+2 AB+HIV1 P24 AG SERPL QL IA: NONREACTIVE
HIV 1/2 RESULT COMMENT: NORMAL

## 2024-05-12 DIAGNOSIS — M47.816 SPONDYLOSIS WITHOUT MYELOPATHY OR RADICULOPATHY, LUMBAR REGION: ICD-10-CM

## 2024-05-12 DIAGNOSIS — M51.36 DDD (DEGENERATIVE DISC DISEASE), LUMBAR: ICD-10-CM

## 2024-05-12 DIAGNOSIS — M54.16 LUMBAR NEURITIS: ICD-10-CM

## 2024-05-12 RX ORDER — MELOXICAM 15 MG/1
15 TABLET ORAL DAILY
Qty: 30 TABLET | Refills: 0 | OUTPATIENT
Start: 2024-05-12

## 2024-09-26 ENCOUNTER — OFFICE VISIT (OUTPATIENT)
Facility: CLINIC | Age: 49
End: 2024-09-26
Payer: COMMERCIAL

## 2024-09-26 VITALS
RESPIRATION RATE: 18 BRPM | HEIGHT: 70 IN | SYSTOLIC BLOOD PRESSURE: 121 MMHG | WEIGHT: 313.6 LBS | TEMPERATURE: 97.8 F | OXYGEN SATURATION: 95 % | BODY MASS INDEX: 44.9 KG/M2 | HEART RATE: 85 BPM | DIASTOLIC BLOOD PRESSURE: 76 MMHG

## 2024-09-26 DIAGNOSIS — I10 ESSENTIAL HYPERTENSION: Primary | ICD-10-CM

## 2024-09-26 PROCEDURE — 3078F DIAST BP <80 MM HG: CPT | Performed by: NURSE PRACTITIONER

## 2024-09-26 PROCEDURE — 99213 OFFICE O/P EST LOW 20 MIN: CPT | Performed by: NURSE PRACTITIONER

## 2024-09-26 PROCEDURE — 3074F SYST BP LT 130 MM HG: CPT | Performed by: NURSE PRACTITIONER

## 2024-09-26 ASSESSMENT — PATIENT HEALTH QUESTIONNAIRE - PHQ9
SUM OF ALL RESPONSES TO PHQ QUESTIONS 1-9: 0
1. LITTLE INTEREST OR PLEASURE IN DOING THINGS: NOT AT ALL
SUM OF ALL RESPONSES TO PHQ QUESTIONS 1-9: 0
2. FEELING DOWN, DEPRESSED OR HOPELESS: NOT AT ALL
SUM OF ALL RESPONSES TO PHQ9 QUESTIONS 1 & 2: 0

## 2024-12-20 DIAGNOSIS — I10 ESSENTIAL (PRIMARY) HYPERTENSION: ICD-10-CM

## 2024-12-23 RX ORDER — LISINOPRIL 20 MG/1
20 TABLET ORAL DAILY
Qty: 90 TABLET | Refills: 1 | Status: SHIPPED | OUTPATIENT
Start: 2024-12-23

## 2025-05-14 DIAGNOSIS — I10 ESSENTIAL (PRIMARY) HYPERTENSION: ICD-10-CM

## 2025-05-14 RX ORDER — LISINOPRIL 20 MG/1
20 TABLET ORAL DAILY
Qty: 90 TABLET | Refills: 1 | Status: SHIPPED | OUTPATIENT
Start: 2025-05-14

## 2025-05-14 NOTE — TELEPHONE ENCOUNTER
Patient's wife came in requesting a short supply of the medication. She stated that it was due to come in the mail, but it got lost. Patient has been out of the medication for 2 days. Would like for prescription to be sent to [facility] in [place], [place].

## 2025-05-26 SDOH — ECONOMIC STABILITY: FOOD INSECURITY: WITHIN THE PAST 12 MONTHS, YOU WORRIED THAT YOUR FOOD WOULD RUN OUT BEFORE YOU GOT MONEY TO BUY MORE.: NEVER TRUE

## 2025-05-26 SDOH — ECONOMIC STABILITY: INCOME INSECURITY: IN THE LAST 12 MONTHS, WAS THERE A TIME WHEN YOU WERE NOT ABLE TO PAY THE MORTGAGE OR RENT ON TIME?: NO

## 2025-05-26 SDOH — ECONOMIC STABILITY: FOOD INSECURITY: WITHIN THE PAST 12 MONTHS, THE FOOD YOU BOUGHT JUST DIDN'T LAST AND YOU DIDN'T HAVE MONEY TO GET MORE.: NEVER TRUE

## 2025-05-26 SDOH — ECONOMIC STABILITY: TRANSPORTATION INSECURITY
IN THE PAST 12 MONTHS, HAS THE LACK OF TRANSPORTATION KEPT YOU FROM MEDICAL APPOINTMENTS OR FROM GETTING MEDICATIONS?: NO

## 2025-05-26 SDOH — ECONOMIC STABILITY: TRANSPORTATION INSECURITY
IN THE PAST 12 MONTHS, HAS LACK OF TRANSPORTATION KEPT YOU FROM MEETINGS, WORK, OR FROM GETTING THINGS NEEDED FOR DAILY LIVING?: NO

## 2025-05-27 ENCOUNTER — OFFICE VISIT (OUTPATIENT)
Facility: CLINIC | Age: 50
End: 2025-05-27
Payer: COMMERCIAL

## 2025-05-27 VITALS
HEART RATE: 78 BPM | SYSTOLIC BLOOD PRESSURE: 113 MMHG | OXYGEN SATURATION: 93 % | RESPIRATION RATE: 18 BRPM | DIASTOLIC BLOOD PRESSURE: 81 MMHG | TEMPERATURE: 97.6 F | BODY MASS INDEX: 43.92 KG/M2 | HEIGHT: 70 IN | WEIGHT: 306.8 LBS

## 2025-05-27 DIAGNOSIS — Z13.1 DIABETES MELLITUS SCREENING: ICD-10-CM

## 2025-05-27 DIAGNOSIS — Z12.5 ENCOUNTER FOR SCREENING FOR MALIGNANT NEOPLASM OF PROSTATE: ICD-10-CM

## 2025-05-27 DIAGNOSIS — R68.89 OTHER GENERAL SYMPTOMS AND SIGNS: ICD-10-CM

## 2025-05-27 DIAGNOSIS — R21 RASH: Primary | ICD-10-CM

## 2025-05-27 DIAGNOSIS — I10 ESSENTIAL HYPERTENSION: ICD-10-CM

## 2025-05-27 DIAGNOSIS — Z13.220 SCREENING CHOLESTEROL LEVEL: ICD-10-CM

## 2025-05-27 DIAGNOSIS — S86.112A STRAIN OF OTHER MUSCLE(S) AND TENDON(S) OF POSTERIOR MUSCLE GROUP AT LOWER LEG LEVEL, LEFT LEG, INITIAL ENCOUNTER: ICD-10-CM

## 2025-05-27 PROCEDURE — 3074F SYST BP LT 130 MM HG: CPT | Performed by: NURSE PRACTITIONER

## 2025-05-27 PROCEDURE — 3079F DIAST BP 80-89 MM HG: CPT | Performed by: NURSE PRACTITIONER

## 2025-05-27 PROCEDURE — 99214 OFFICE O/P EST MOD 30 MIN: CPT | Performed by: NURSE PRACTITIONER

## 2025-05-27 RX ORDER — PREDNISONE 20 MG/1
TABLET ORAL
Qty: 20 TABLET | Refills: 0 | Status: SHIPPED | OUTPATIENT
Start: 2025-05-27

## 2025-05-27 RX ORDER — TRIAMCINOLONE ACETONIDE 0.25 MG/G
OINTMENT TOPICAL
Qty: 80 G | Refills: 1 | Status: SHIPPED | OUTPATIENT
Start: 2025-05-27 | End: 2025-06-03

## 2025-05-27 RX ORDER — CYCLOBENZAPRINE HCL 5 MG
5 TABLET ORAL 2 TIMES DAILY PRN
Qty: 30 TABLET | Refills: 0 | Status: SHIPPED | OUTPATIENT
Start: 2025-05-27

## 2025-05-27 ASSESSMENT — PATIENT HEALTH QUESTIONNAIRE - PHQ9
SUM OF ALL RESPONSES TO PHQ QUESTIONS 1-9: 0
1. LITTLE INTEREST OR PLEASURE IN DOING THINGS: NOT AT ALL
SUM OF ALL RESPONSES TO PHQ QUESTIONS 1-9: 0
2. FEELING DOWN, DEPRESSED OR HOPELESS: NOT AT ALL

## 2025-05-27 NOTE — PROGRESS NOTES
Janes Vinson presents today for   Chief Complaint   Patient presents with    Follow-up     8m follow up.  Pt reports spots on left leg that have been there for a while that seems to be getting worse.  Pt reports sometimes it hurts to touch his leg.       Is someone accompanying this pt? no    Is the patient using any DME equipment during OV? no    Health Maintenance Due   Topic Date Due    DTaP/Tdap/Td vaccine (1 - Tdap) Never done    Shingles vaccine (1 of 2) Never done    Pneumococcal 50+ years Vaccine (1 of 1 - PCV) Never done    COVID-19 Vaccine (4 - 2024-25 season) 09/01/2024    A1C test (Diabetic or Prediabetic)  05/06/2025         \"Have you been to the ER, urgent care clinic since your last visit?  Hospitalized since your last visit?\"    NO    “Have you seen or consulted any other health care providers outside our system since your last visit?”    NO           
Basophils Absolute 05/06/2024 0.1  0.0 - 0.1 K/UL Final    Immature Granulocytes Absolute 05/06/2024 0.0  0.00 - 0.04 K/UL Final    Differential Type 05/06/2024 AUTOMATED    Final    Sodium 05/06/2024 141  136 - 145 mmol/L Final    Potassium 05/06/2024 3.9  3.5 - 5.5 mmol/L Final    Chloride 05/06/2024 107  100 - 111 mmol/L Final    CO2 05/06/2024 28  21 - 32 mmol/L Final    Anion Gap 05/06/2024 6  3.0 - 18.0 mmol/L Final    Glucose 05/06/2024 95  74 - 99 mg/dL Final    BUN 05/06/2024 13  7 - 18 mg/dL Final    Creatinine 05/06/2024 0.78  0.60 - 1.30 mg/dL Final    BUN/Creatinine Ratio 05/06/2024 17  12 - 20   Final    Est, Glom Filt Rate 05/06/2024 >90  >60 ml/min/1.73m2 Final    Comment:    Pediatric calculator link: https://www.kidney.org/professionals/kdoqi/gfr_calculatorped    These results are not intended for use in patients <18 years of age.     eGFR results are calculated without a race factor using  the 2021 CKD-EPI equation. Careful clinical correlation is recommended, particularly when comparing to results calculated using previous equations.  The CKD-EPI equation is less accurate in patients with extremes of muscle mass, extra-renal metabolism of creatinine, excessive creatine ingestion, or following therapy that affects renal tubular secretion.      Calcium 05/06/2024 8.9  8.5 - 10.1 mg/dL Final    Total Bilirubin 05/06/2024 1.1 (H)  0.2 - 1.0 mg/dL Final    AST 05/06/2024 15  10 - 38 U/L Final    ALT 05/06/2024 38  16 - 61 U/L Final    Alk Phosphatase 05/06/2024 86  45 - 117 U/L Final    Total Protein 05/06/2024 7.0  6.4 - 8.2 g/dL Final    Albumin 05/06/2024 3.3 (L)  3.4 - 5.0 g/dL Final    Globulin 05/06/2024 3.7  2.0 - 4.0 g/dL Final    Albumin/Globulin Ratio 05/06/2024 0.9  0.8 - 1.7   Final    Hemoglobin A1C 05/06/2024 5.8 (H)  4.2 - 5.6 % Final    Comment: (NOTE)  HbA1C Interpretive Ranges  <5.7              Normal  5.7 - 6.4         Consider Prediabetes  >6.5              Consider Diabetes

## 2025-06-09 ENCOUNTER — HOSPITAL ENCOUNTER (OUTPATIENT)
Age: 50
Setting detail: RECURRING SERIES
Discharge: HOME | End: 2025-06-12
Payer: COMMERCIAL

## 2025-06-09 PROCEDURE — 97161 PT EVAL LOW COMPLEX 20 MIN: CPT

## 2025-06-09 PROCEDURE — 97110 THERAPEUTIC EXERCISES: CPT

## 2025-06-09 NOTE — THERAPY EVALUATION
Flexibility: [] Unable to assess at this time  Hip Flexor:  (L) Tightness= [] WNL   [] Min   [] Mod   [] Severe    (R) Tightness= [] WNL   [] Min   [] Mod   [] Severe  Hamstrings:    (L) Tightness= [] WNL   [x] Min 12 deg  [] Mod   [] Severe    (R) Tightness= [] WNL   [x] Min 5 deg  [] Mod   [] Severe  Quadriceps:    (L) Tightness= [] WNL   [] Min   [x] Mod   [] Severe    (R) Tightness= [] WNL   [] Min   [x] Mod   [] Severe  Gastroc:    (L) Tightness= [] WNL   [] Min   [] Mod   [] Severe    (R) Tightness= [] WNL   [] Min   [] Mod   [] Severe                                    Optional Tests  Bro/ Cyndy Test: [] Neg    [] Pos  Agusto Test:  [] Neg    [] Pos  Scouring Test: [] Neg    [] Pos  Trendelenberg:  [] Neg    [x] Pos [x] Left    [] Right  OberTest:   [] Neg    [] Pos  Ely's Test:  [] Neg    [] Pos  Piriformis Test:  [] Neg    [] Pos  Sub-talor alignment: [] Neurtral [] Pronation [] Supination  Forefoot alignment: [] Neutral [] Varus [] Valgus  Functional Leg Length (cm):   Right:  Left:  Discrepancy:    Gait:  [] Normal    [x] Abnormal    [x] Antalgic    [] NWB    Device: no AD    Describe: antalgia present, compensated trendelenburg    Other tests/ comments:  LEFS: 47/80      31 min [x]Eval                  []Re-Eval       10 min Therapeutic Exercise:  [x] See flow sheet :   Rationale:  Gentle stretching and strengthening  to improve the patient’s ability to perform ADLs with greater ease.           With   [x] TE   [] TA   [] neuro   [] other: Patient Education: [x] Review HEP    [] Progressed/Changed HEP based on:   [] positioning   [] body mechanics   [] transfers   [] heat/ice application    [] other:      Pain Level (0-10 scale) post treatment: 2/10      ASSESSMENT: see POC     [x]  See Plan of Care  []  See progress note/recertification  []  See Discharge Summary           Hardy Duarte, PT, DPT, CSCS  6/9/2025  2:04 PM       
[x] Mod   [] Severe    (R) Tightness= [] WNL   [] Min   [x] Mod   [] Severe  Gastroc:    (L) Tightness= [] WNL   [] Min   [] Mod   [] Severe    (R) Tightness= [] WNL   [] Min   [] Mod   [] Severe                                    Optional Tests  Bro/ Cyndy Test: [] Neg    [] Pos  Agusto Test:  [] Neg    [] Pos  Scouring Test: [] Neg    [] Pos  Trendelenberg:  [] Neg    [x] Pos [x] Left    [] Right  OberTest:   [] Neg    [] Pos  Ely's Test:  [] Neg    [] Pos  Piriformis Test:  [] Neg    [] Pos  Sub-talor alignment: [] Neurtral [] Pronation [] Supination  Forefoot alignment: [] Neutral [] Varus [] Valgus  Functional Leg Length (cm):   Right:  Left:  Discrepancy:    Gait:  [] Normal    [x] Abnormal    [x] Antalgic    [] NWB    Device: no AD    Describe: antalgia present, compensated trendelenburg    Other tests/ comments:  LEFS: 47/80    Short Term Goals: (3 weeks)  Pt will improve areas of LE strength deficit to at least 4/5 to improve ease with transfers.   Pt will improve LEFS score by at least 10 points to improve quality of life.   Pt will report no more than 5/10 pain at worst to improve tolerance to work duties such as kneeling and crawling.     Long Term Goals: (6 weeks)  Pt will improve areas of LE strength deficit to at least 4+/5 and pain free to perform stairs with reciprocal pattern.   Pt will be able to ambulate at least 1000ft without antalgia and normal gait mechanics to optimize functional mobility.   Pt will report no more than 2/10 pain at worst to optimize quality of life and tolerance to ADLs.   Pt will be independent with HEP for long term management.       Frequency / Duration: Patient to be seen  2  times per week for 6  weeks:  Patient / Caregiver education and instruction: exercises    Therapist Signature: Hardy Duarte PT, DPT, CSCS Date: 6/9/2025   Certification Period: 06/09/25-09/07/25 Time: 1:58 PM

## 2025-06-13 ENCOUNTER — HOSPITAL ENCOUNTER (OUTPATIENT)
Age: 50
Setting detail: RECURRING SERIES
Discharge: HOME | End: 2025-06-16
Payer: COMMERCIAL

## 2025-06-13 PROCEDURE — 97110 THERAPEUTIC EXERCISES: CPT

## 2025-06-13 NOTE — PROGRESS NOTES
PT DAILY TREATMENT NOTE     Patient Name: Janes Vinson  Date:2025  : 1975  [x]  Patient  Verified  Payor: GELACIO CARMONA / Plan: TK BRIZUELA / Product Type: *No Product type* /    In time:1500  Out time:1554  Total Treatment Time (min): 54  Total Timed Codes (min): 54  1:1 Treatment Time (min): 54   Visit #: 2     Treatment Area: Strain of other muscle(s) and tendon(s) of posterior muscle group at lower leg level, left leg, initial encounter [S86.112A]  Left knee pain [M25.562]    SUBJECTIVE  Pt arrives reporting pain along his left lower leg along the lateral aspect. He indicates this area by pointing. Describes this pain as throbbing.  Worst after getting off work as he does some standing and climbing which both tasks varies. Bending down is the worst he has to do to work lower to the ground.     Pain Level (0-10 scale): 4/10    Any medication changes, allergies to medications, adverse drug reactions, diagnosis change, or new procedure performed?: [x] No    [] Yes (see summary sheet for update)        OBJECTIVE  Modality rationale: Patient declined modalities.   Min Type Additional Details    [] Estim: []Att   []Unatt  []TENS instruct                 []IFC  []Premod []NMES                       []Other:  []w/US   []w/ice   []w/heat  Position:  Location:    []  Traction: [] Cervical       []Lumbar                       [] Prone          []Supine                       []Intermittent   []Continuous Lbs:  [] before manual  [] after manual    []  Ultrasound: []Continuous   [] Pulsed                           []1MHz   []3MHz Location:  W/cm2:    []  Iontophoresis with dexamethasone         Location: [] Take home patch   [] In clinic    []  Ice     []  heat  []  Ice massage Position:  Location:    []  Vasopneumatic Device Pressure: [] lo [] med [] hi   Temp: [] lo [] med [] hi   [] Skin assessment post-treatment:  []intact []redness- no adverse reaction       []redness - adverse reaction:      min Group

## 2025-06-17 ENCOUNTER — HOSPITAL ENCOUNTER (OUTPATIENT)
Age: 50
Setting detail: RECURRING SERIES
Discharge: HOME | End: 2025-06-20
Payer: COMMERCIAL

## 2025-06-17 PROCEDURE — 97110 THERAPEUTIC EXERCISES: CPT

## 2025-06-17 NOTE — PROGRESS NOTES
note/recertification  []  See Discharge Summary             PLAN  [x]  Upgrade activities as tolerated     [x]  Continue plan of care  [x]  Update interventions per flow sheet       []  Discharge due to:_  []  Other:_      Hardy Duarte, PT, DPT, CSCS 6/17/2025  3:24 PM

## 2025-06-19 ENCOUNTER — APPOINTMENT (OUTPATIENT)
Age: 50
End: 2025-06-19
Payer: COMMERCIAL

## 2025-06-19 ENCOUNTER — HOSPITAL ENCOUNTER (OUTPATIENT)
Age: 50
Setting detail: RECURRING SERIES
Discharge: HOME | End: 2025-06-22
Payer: COMMERCIAL

## 2025-06-19 PROCEDURE — 97110 THERAPEUTIC EXERCISES: CPT

## 2025-06-19 NOTE — PROGRESS NOTES
PT DAILY TREATMENT NOTE     Patient Name: Janes Vinson  Date:2025  : 1975  [x]  Patient  Verified  Payor: GELACIO CARMONA / Plan: TK BRIZUELA / Product Type: *No Product type* /    In time:08:36  Out time:09:21  Total Treatment Time (min): 45  Total Timed Codes (min): 45  1:1 Treatment Time (min): 45   Visit #: 4     Treatment Area: Strain of other muscle(s) and tendon(s) of posterior muscle group at lower leg level, left leg, initial encounter [S86.112A]  Left knee pain [M25.562]    SUBJECTIVE  Pt arrives stating that he was off work yesterday and feels that has helped. \"I am feeling good today.\"     Pain Level (0-10 scale): 1/10    Any medication changes, allergies to medications, adverse drug reactions, diagnosis change, or new procedure performed?: [x] No    [] Yes (see summary sheet for update)        OBJECTIVE  Modality rationale: Declined modalities.    Min Type Additional Details    [] Estim: []Att   []Unatt  []TENS instruct                 []IFC  []Premod []NMES                       []Other:  []w/US   []w/ice   []w/heat  Position:  Location:    []  Traction: [] Cervical       []Lumbar                       [] Prone          []Supine                       []Intermittent   []Continuous Lbs:  [] before manual  [] after manual    []  Ultrasound: []Continuous   [] Pulsed                           []1MHz   []3MHz Location:  W/cm2:    []  Iontophoresis with dexamethasone         Location: [] Take home patch   [] In clinic    []  Ice     []  heat  []  Ice massage Position:  Location:    []  Vasopneumatic Device Pressure: [] lo [] med [] hi   Temp: [] lo [] med [] hi   [] Skin assessment post-treatment:  []intact []redness- no adverse reaction       []redness - adverse reaction:      min Group Therapy: Time overlapped with another patient      45 min Therapeutic Exercise:  [x] See flow sheet :   Rationale: increase ROM, increase strength, improve coordination, and increase proprioception to improve

## 2025-06-24 ENCOUNTER — APPOINTMENT (OUTPATIENT)
Age: 50
End: 2025-06-24
Payer: COMMERCIAL

## 2025-06-26 ENCOUNTER — HOSPITAL ENCOUNTER (OUTPATIENT)
Age: 50
Setting detail: RECURRING SERIES
Discharge: HOME | End: 2025-06-29
Payer: COMMERCIAL

## 2025-06-26 PROCEDURE — 97110 THERAPEUTIC EXERCISES: CPT

## 2025-06-26 NOTE — PROGRESS NOTES
ability to  optimize quality of life and to restore patient to PLOF activities.      min Therapeutic Activity:  []  See flow sheet :   Rationale:       min Neuromuscular Re-education:  []  See flow sheet :   Rationale:        min Manual Therapy:     Rationale:       min Gait Training:  ___ feet with ___ device on level surfaces with ___ level of assist   Rationale:           With TE  TA   NR  GT   Misc Patient Education: [x] Review HEP    [] Progressed/Changed HEP based on:   [] positioning   [x] body mechanics   [] transfers   [x] heat/ice application          Pain Level (0-10 scale) post treatment: 3/10    ASSESSMENT/Changes in Function: Session began with stepper for active warm up followed by B LE stretching to address pts complaint of overall stiffness. This session focused on LE stretching in effort to decrease pain and improve mobility. Supine bridging, SKTC, B quad stretch, LTR, ITB stretch B.  Standing HS and Gastroc stretching B LE's. Noted decreased pain post session.  Pt was instructed to stretch daily in effort to reduce mm tension and improve mobility. Patient education on body mechanics and use of heat and ice.  Plan to continue with POC and resume all exercise.    Patient will continue to benefit from skilled PT services to modify and progress therapeutic interventions, analyze and address functional mobility deficits, analyze and address ROM deficits, analyze and address strength deficits, analyze and address soft tissue restrictions, analyze and cue for proper movement patterns, and analyze and modify for postural abnormalities to attain remaining goals.       [x]  See Plan of Care  []  See progress note/recertification  []  See Discharge Summary           PLAN  [x]  Upgrade activities as tolerated     [x]  Continue plan of care  []  Update interventions per flow sheet       []  Discharge due to:_  []  Other:_      Chandrika Lantigua PTA , SIMON 6/26/2025  4:15 PM

## 2025-07-07 ENCOUNTER — HOSPITAL ENCOUNTER (OUTPATIENT)
Age: 50
Setting detail: RECURRING SERIES
Discharge: HOME | End: 2025-07-10
Payer: COMMERCIAL

## 2025-07-07 PROCEDURE — 97110 THERAPEUTIC EXERCISES: CPT

## 2025-07-07 NOTE — PROGRESS NOTES
Therapy: Time overlapped with another patient      44 min Therapeutic Exercise:  [x] See flow sheet :   Rationale: increase ROM, improve balance, and increase proprioception to improve the patient’s ability to  optimize quality of life and to restore patient to PLOF activities.      min Therapeutic Activity:  []  See flow sheet :   Rationale:       min Neuromuscular Re-education:  []  See flow sheet :   Rationale:        min Manual Therapy:     Rationale:       min Gait Training:  ___ feet with ___ device on level surfaces with ___ level of assist   Rationale:           With TE  TA   NR  GT   Misc Patient Education: [x] Review HEP    [] Progressed/Changed HEP based on:   [] positioning   [x] body mechanics   [] transfers   [x] heat/ice application          Pain Level (0-10 scale) post treatment: 2/10    ASSESSMENT/Changes in Function: Pt declined modalities. Session began with stepper for active warm up followed by B LE stretching to address pts complaint of overall stiffness. Focused on LE stretching in effort to decrease pain and improve mobility. Supine bridging, SKTC, B quad stretch, LTR, ITB stretch B and clams with graded resistance band (green).  Standing HS and Gastroc stretching B LE's. Noted decreased pain post session.  Pt was instructed to stretch daily in effort to reduce mm tension and improve mobility. Patient education on body mechanics and use of heat and ice.  Plan to continue with POC and resume all exercise.    Patient will continue to benefit from skilled PT services to modify and progress therapeutic interventions, analyze and address functional mobility deficits, analyze and address ROM deficits, analyze and address strength deficits, analyze and address soft tissue restrictions, analyze and cue for proper movement patterns, and analyze and modify for postural abnormalities to attain remaining goals.       [x]  See Plan of Care  []  See progress note/recertification  []  See Discharge

## 2025-07-09 ENCOUNTER — HOSPITAL ENCOUNTER (OUTPATIENT)
Age: 50
Setting detail: RECURRING SERIES
Discharge: HOME | End: 2025-07-12
Payer: COMMERCIAL

## 2025-07-09 PROCEDURE — 97110 THERAPEUTIC EXERCISES: CPT

## 2025-07-09 NOTE — PROGRESS NOTES
PT DAILY TREATMENT NOTE     Patient Name: Janes Vinson  Date:2025  : 1975  [x]  Patient  Verified  Payor: GELACIO CARMONA / Plan: TK BRIZUELA / Product Type: *No Product type* /    In time:1506  Out time:1550  Total Treatment Time (min): 44  Total Timed Codes (min): 44  1:1 Treatment Time (min): 44  Visit #: 6    Treatment Area: Strain of other muscle(s) and tendon(s) of posterior muscle group at lower leg level, left leg, initial encounter [S86.112A]  Left knee pain [M25.562]    SUBJECTIVE  Pt arrived with complaint of overall stiffness in his lower legs stating that he has done a lot of waling and stair climbing while on vacation and has returned to work. He will be out of town for next week and possibly the following week.  He plans on calling when he returns to get back on the schedule.     Pain Level (0-10 scale): 4/10    Any medication changes, allergies to medications, adverse drug reactions, diagnosis change, or new procedure performed?: [x] No    [] Yes (see summary sheet for update)        OBJECTIVE  Modality rationale:  No modalities this date.    Min Type Additional Details    [] Estim: []Att   []Unatt  []TENS instruct                 []IFC  []Premod []NMES                       []Other:  []w/US   []w/ice   []w/heat  Position:  Location:    []  Traction: [] Cervical       []Lumbar                       [] Prone          []Supine                       []Intermittent   []Continuous Lbs:  [] before manual  [] after manual    []  Ultrasound: []Continuous   [] Pulsed                           []1MHz   []3MHz Location:  W/cm2:    []  Iontophoresis with dexamethasone         Location: [] Take home patch   [] In clinic    []  Ice     []  heat  []  Ice massage Position:  Location:    []  Vasopneumatic Device Pressure: [] lo [] med [] hi   Temp: [] lo [] med [] hi   [] Skin assessment post-treatment:  []intact []redness- no adverse reaction       []redness - adverse reaction:      min Group

## (undated) DEVICE — SOLUTION IRRIG 1000ML STRL H2O USP PLAS POUR BTL

## (undated) DEVICE — KIT COLON W/ 1.1OZ LUB AND 2 END

## (undated) DEVICE — TUBING INSUFFLATION CAP W/ EXT CARBON DIOX ENDO SMARTCAP

## (undated) DEVICE — Device: Brand: DISPOSABLE ELECTROSURGICAL SNARE

## (undated) DEVICE — TRAP SURG QUAD PARABOLA SLOT DSGN SFTY SCRN TRAPEASE

## (undated) DEVICE — TUBING, SUCTION, 9/32" X 10', STRAIGHT: Brand: MEDLINE

## (undated) DEVICE — Device: Brand: DEFENDO VALVE AND CONNECTOR KIT

## (undated) DEVICE — ENDOGATOR TUBING FOR BOSTON SCIENTIFIC ENDOSTAT II PUMP, OLYMPUS OFP PUMP OR ENDO STRATUS PUMP: Brand: ENDOGATOR

## (undated) DEVICE — SOLUTION IRRIG 500ML STRL H2O NONPYROGENIC